# Patient Record
Sex: MALE | Race: OTHER | HISPANIC OR LATINO
[De-identification: names, ages, dates, MRNs, and addresses within clinical notes are randomized per-mention and may not be internally consistent; named-entity substitution may affect disease eponyms.]

---

## 2019-10-23 ENCOUNTER — APPOINTMENT (OUTPATIENT)
Dept: GASTROENTEROLOGY | Facility: CLINIC | Age: 54
End: 2019-10-23
Payer: MEDICAID

## 2019-10-23 VITALS
DIASTOLIC BLOOD PRESSURE: 89 MMHG | BODY MASS INDEX: 26.05 KG/M2 | SYSTOLIC BLOOD PRESSURE: 127 MMHG | WEIGHT: 182 LBS | HEIGHT: 70 IN | HEART RATE: 77 BPM

## 2019-10-23 DIAGNOSIS — Z78.9 OTHER SPECIFIED HEALTH STATUS: ICD-10-CM

## 2019-10-23 PROCEDURE — 99213 OFFICE O/P EST LOW 20 MIN: CPT

## 2019-10-24 NOTE — ASSESSMENT
[FreeTextEntry1] : The patient is a 54-year-old gentleman with no past medical history presents for evaluation after found to have a colon polyp. Patient had a scope by  and was found to have a large polyp. \par \par Endoscopic Mucosal Resection\par - Procedure discussed with patient in detail  with risks addressed such as bleeding and perforation that may require surgery\par - Prep prescribed and adherence stressed so visualization is optimal \par - Clear diet 24 hours prior to procedure \par - Follow up 3-4 weeks after procedure stressed to review pathology, arrange future treatments, and to insure follow ups for future surveillance are secure \par - Advised to hold NSAIDS including Aspirin one week prior\par

## 2019-10-24 NOTE — HISTORY OF PRESENT ILLNESS
[de-identified] : The patient is a 54-year-old gentleman with no past medical history presents for evaluation after found to have a colon polyp. Patient had a scope by  and was found to have a large polyp.

## 2019-11-22 ENCOUNTER — OUTPATIENT (OUTPATIENT)
Dept: OUTPATIENT SERVICES | Facility: HOSPITAL | Age: 54
LOS: 1 days | Discharge: HOME | End: 2019-11-22
Payer: MEDICAID

## 2019-11-22 ENCOUNTER — RESULT REVIEW (OUTPATIENT)
Age: 54
End: 2019-11-22

## 2019-11-22 ENCOUNTER — TRANSCRIPTION ENCOUNTER (OUTPATIENT)
Age: 54
End: 2019-11-22

## 2019-11-22 VITALS
HEIGHT: 70 IN | TEMPERATURE: 98 F | HEART RATE: 74 BPM | DIASTOLIC BLOOD PRESSURE: 69 MMHG | RESPIRATION RATE: 18 BRPM | SYSTOLIC BLOOD PRESSURE: 127 MMHG | WEIGHT: 179.9 LBS

## 2019-11-22 VITALS
SYSTOLIC BLOOD PRESSURE: 128 MMHG | OXYGEN SATURATION: 99 % | HEART RATE: 74 BPM | RESPIRATION RATE: 16 BRPM | DIASTOLIC BLOOD PRESSURE: 71 MMHG

## 2019-11-22 DIAGNOSIS — Z98.890 OTHER SPECIFIED POSTPROCEDURAL STATES: Chronic | ICD-10-CM

## 2019-11-22 PROCEDURE — 45380 COLONOSCOPY AND BIOPSY: CPT | Mod: XU

## 2019-11-22 PROCEDURE — 45390 COLONOSCOPY W/RESECTION: CPT

## 2019-11-22 PROCEDURE — 45381 COLONOSCOPY SUBMUCOUS NJX: CPT | Mod: XU

## 2019-11-22 PROCEDURE — 88305 TISSUE EXAM BY PATHOLOGIST: CPT | Mod: 26

## 2019-11-22 NOTE — CHART NOTE - NSCHARTNOTEFT_GEN_A_CORE
PACU ANESTHESIA PACU ADMISSION NOTE      Procedure:  Post op diagnosis    ____ Intubated  TV:______       Rate: ______      FiO2: ______    ___x_ Patent Airway    ___x_ Full return of protective reflexes    ____ Full recovery from anesthesia / sedation to baseline status    Viitals:  see anesthesia record    Mental Status:  __x__ Awake   _____ Alert   _____ Drowsy   _____ Sedated    Nausea/Vomiting: ____ Yes, See Post - Op Orders      _x___ No    Pain Scale (0-10): _____    Treatment: ____ None    __x__ See Post - Op/PCA Orders    Post - Operative Fluids:   ____ Oral   __x__ See Post - Op Orders    Plan:         Discharge:   _x___Home       _____Floor         _____Critical Care    _____Other:_________________    Comments: uneventful perioperative course; no s/s anesthesia complications noted; D/C home when criteria met

## 2019-12-05 LAB — SURGICAL PATHOLOGY STUDY: SIGNIFICANT CHANGE UP

## 2019-12-06 DIAGNOSIS — D12.3 BENIGN NEOPLASM OF TRANSVERSE COLON: ICD-10-CM

## 2019-12-06 DIAGNOSIS — C18.2 MALIGNANT NEOPLASM OF ASCENDING COLON: ICD-10-CM

## 2019-12-06 DIAGNOSIS — Z91.018 ALLERGY TO OTHER FOODS: ICD-10-CM

## 2020-01-10 ENCOUNTER — APPOINTMENT (OUTPATIENT)
Dept: GASTROENTEROLOGY | Facility: CLINIC | Age: 55
End: 2020-01-10
Payer: MEDICAID

## 2020-01-10 VITALS
SYSTOLIC BLOOD PRESSURE: 146 MMHG | HEART RATE: 95 BPM | HEIGHT: 70 IN | WEIGHT: 180 LBS | BODY MASS INDEX: 25.77 KG/M2 | DIASTOLIC BLOOD PRESSURE: 94 MMHG

## 2020-01-10 DIAGNOSIS — Z86.010 PERSONAL HISTORY OF COLONIC POLYPS: ICD-10-CM

## 2020-01-10 DIAGNOSIS — D36.9 BENIGN NEOPLASM, UNSPECIFIED SITE: ICD-10-CM

## 2020-01-10 PROCEDURE — 99214 OFFICE O/P EST MOD 30 MIN: CPT

## 2020-01-10 NOTE — PHYSICAL EXAM
[General Appearance - Alert] : alert [General Appearance - In No Acute Distress] : in no acute distress [Sclera] : the sclera and conjunctiva were normal [PERRL With Normal Accommodation] : pupils were equal in size, round, and reactive to light [Outer Ear] : the ears and nose were normal in appearance [Extraocular Movements] : extraocular movements were intact [Oropharynx] : the oropharynx was normal [Neck Appearance] : the appearance of the neck was normal [Neck Cervical Mass (___cm)] : no neck mass was observed [Thyroid Diffuse Enlargement] : the thyroid was not enlarged [Thyroid Nodule] : there were no palpable thyroid nodules [Jugular Venous Distention Increased] : there was no jugular-venous distention [Auscultation Breath Sounds / Voice Sounds] : lungs were clear to auscultation bilaterally [Heart Sounds] : normal S1 and S2 [Heart Sounds Gallop] : no gallops [Heart Rate And Rhythm] : heart rate was normal and rhythm regular [Murmurs] : no murmurs [Full Pulse] : the pedal pulses are present [Heart Sounds Pericardial Friction Rub] : no pericardial rub [Edema] : there was no peripheral edema [Bowel Sounds] : normal bowel sounds [Breast Palpation Mass] : no palpable masses [Abdomen Tenderness] : non-tender [Abdomen Soft] : soft [] : no hepato-splenomegaly [Abdomen Mass (___ Cm)] : no abdominal mass palpated [Abnormal Walk] : normal gait [Nail Clubbing] : no clubbing  or cyanosis of the fingernails [Motor Tone] : muscle strength and tone were normal [No Focal Deficits] : no focal deficits [Mood] : the mood was normal [Affect] : the affect was normal

## 2020-01-11 NOTE — ASSESSMENT
[FreeTextEntry1] : The patient is a 54-year-old gentleman with no past medical history presents for evaluation after found to have a colon polyp. Patient had a scope by  and was found to have a large polyp. Patient had Colonoscopy with EMR of a Ascending Colon polyp. 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 Discussed Colonoscopy in detail with patient. Discussed the need for repeat Colonoscopy with \par \par Tubullovillous Adenoma with foci of High grade Dysplasia\par - Referral to Dr. [facility] rectal surgery\par - Discussed that he might need Colon Resection\par - Appointment setup with [facility]

## 2020-01-11 NOTE — HISTORY OF PRESENT ILLNESS
[de-identified] : The patient is a 54-year-old gentleman with no past medical history presents for evaluation after found to have a colon polyp. Patient had a scope by  and was found to have a large polyp. Patient had Colonoscopy with EMR of a Ascending Colon polyp. 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

## 2020-01-13 ENCOUNTER — LABORATORY RESULT (OUTPATIENT)
Age: 55
End: 2020-01-13

## 2020-01-13 ENCOUNTER — APPOINTMENT (OUTPATIENT)
Dept: SURGERY | Facility: CLINIC | Age: 55
End: 2020-01-13
Payer: MEDICAID

## 2020-01-13 VITALS
TEMPERATURE: 98.2 F | DIASTOLIC BLOOD PRESSURE: 80 MMHG | WEIGHT: 184 LBS | SYSTOLIC BLOOD PRESSURE: 115 MMHG | BODY MASS INDEX: 26.34 KG/M2 | HEART RATE: 78 BPM | HEIGHT: 70 IN

## 2020-01-13 DIAGNOSIS — D12.6 BENIGN NEOPLASM OF COLON, UNSPECIFIED: ICD-10-CM

## 2020-01-13 DIAGNOSIS — D12.2 BENIGN NEOPLASM OF ASCENDING COLON: ICD-10-CM

## 2020-01-13 PROCEDURE — 99204 OFFICE O/P NEW MOD 45 MIN: CPT

## 2020-01-13 RX ORDER — SODIUM SULFATE, POTASSIUM SULFATE, MAGNESIUM SULFATE 17.5; 3.13; 1.6 G/ML; G/ML; G/ML
17.5-3.13-1.6 SOLUTION, CONCENTRATE ORAL
Qty: 1 | Refills: 0 | Status: DISCONTINUED | COMMUNITY
Start: 2019-10-23 | End: 2020-01-13

## 2020-01-13 RX ORDER — MELOXICAM 15 MG/1
TABLET ORAL
Refills: 0 | Status: DISCONTINUED | COMMUNITY
End: 2019-11-01

## 2020-01-13 NOTE — CONSULT LETTER
[Dear  ___] : Dear  [unfilled], [Consult Letter:] : I had the pleasure of evaluating your patient, [unfilled]. [Consult Closing:] : Thank you very much for allowing me to participate in the care of this patient.  If you have any questions, please do not hesitate to contact me. [Please see my note below.] : Please see my note below. [FreeTextEntry3] : Sincerely,\par \par Shine Hirshc MD, Colon and Rectal Surgery\par \par [employer] [facility], [place]\par [address] [place] [address]\par [facility], 3rd Floor\par [place], [place] [address]\par Tel [phone] ext [phone]\par Fax [phone]\par  [DrEric  ___] : Dr. REYES

## 2020-01-13 NOTE — PHYSICAL EXAM
[Abdomen Masses] : No abdominal masses [No HSM] : no hepatosplenomegaly [Abdomen Tenderness] : ~T No ~M abdominal tenderness [Respiratory Effort] : normal respiratory effort [Normal Rate and Rhythm] : normal rate and rhythm [de-identified] : awake, alert and in no acute distress

## 2020-01-13 NOTE — ASSESSMENT
[FreeTextEntry1] : 54M with endoscopically unresectable ascending colon polyp\par \par I had a long discussion with the patient about his pathology.  At this point the patient has a diagnosis of high grade dysplasia/ intramucosal adenocarcinoma.  There remains a persistent portion of the polyp with tubulovillous adenoma on biopsy. I discussed the case with Dr. Granados and given the possible diagnosis of invasive adenocarcinoma we decided surgical resection would be appropriate to remove the remainder of the polyp and have a definitive pathologic evaluation. We will begin with a malignant workup including CT chest abdomen and pelvis with CEA level.  Provided those results are favorable we will proceed with laparoscopic possible open right hemicolectomy.  All risks benefits and alternatives were discussed including bleeding, infection, damage to surrounding structures, anastomotic leak, anastomotic stricture, cardiopulmonary events, thromboembolic events, neuropathy and hernia. We expect a 3-7 day hospital stay and 6-8 week recovery. Patient was in agreement with the plan and signed surgical consent.\par

## 2020-01-13 NOTE — REASON FOR VISIT
[Initial Evaluation] : an initial evaluation [FreeTextEntry1] : Endoscopically Unresectable Ascending Colon Polyp

## 2020-01-13 NOTE — HISTORY OF PRESENT ILLNESS
[FreeTextEntry1] : Patient is a 54M who presents for evaluation of an endoscopically unresectable ascending colon polyp.  Patient originally underwent screening colonoscopy with Dr. Holbrook on 8/23/19.  This showed a 4-5cm ascending colon polyp.  The patient was referred to Dr. Granados to performed EMR on 11/22/19.  The lesion was removed piecemeal and a small central area was left behind due to poor lift.  Pathology showed tubulovillous adenoma with foci of high grade dysplasia/intramucosal adenocarcinoma.  Biopsies of the central area showed partially disrupted fragments of tubulovillous adenoma.  He was seen in follow up with Dr. Granados and recommended a surgical resection.  Patient states that he feels well.  He is tolerating a diet and denies fevers, chills, nausea, vomiting, abdominal pain, constipation, diarrhea, blood in his stool or unexpected weight loss.  Patient denies a family history of colon cancer rectal cancer or inflammatory bowel disease.

## 2020-01-14 ENCOUNTER — OTHER (OUTPATIENT)
Age: 55
End: 2020-01-14

## 2020-01-17 ENCOUNTER — OUTPATIENT (OUTPATIENT)
Dept: OUTPATIENT SERVICES | Facility: HOSPITAL | Age: 55
LOS: 1 days | Discharge: HOME | End: 2020-01-17
Payer: MEDICAID

## 2020-01-17 DIAGNOSIS — D12.8 BENIGN NEOPLASM OF RECTUM: ICD-10-CM

## 2020-01-17 DIAGNOSIS — R10.2 PELVIC AND PERINEAL PAIN: ICD-10-CM

## 2020-01-17 DIAGNOSIS — Z98.890 OTHER SPECIFIED POSTPROCEDURAL STATES: Chronic | ICD-10-CM

## 2020-01-17 DIAGNOSIS — R10.9 UNSPECIFIED ABDOMINAL PAIN: ICD-10-CM

## 2020-01-17 PROCEDURE — 71260 CT THORAX DX C+: CPT | Mod: 26

## 2020-01-17 PROCEDURE — 74177 CT ABD & PELVIS W/CONTRAST: CPT | Mod: 26

## 2020-01-21 ENCOUNTER — OTHER (OUTPATIENT)
Age: 55
End: 2020-01-21

## 2020-01-26 ENCOUNTER — OUTPATIENT (OUTPATIENT)
Dept: OUTPATIENT SERVICES | Facility: HOSPITAL | Age: 55
LOS: 1 days | Discharge: HOME | End: 2020-01-26
Payer: MEDICAID

## 2020-01-26 DIAGNOSIS — D12.6 BENIGN NEOPLASM OF COLON, UNSPECIFIED: ICD-10-CM

## 2020-01-26 DIAGNOSIS — Z98.890 OTHER SPECIFIED POSTPROCEDURAL STATES: Chronic | ICD-10-CM

## 2020-01-26 PROCEDURE — 74183 MRI ABD W/O CNTR FLWD CNTR: CPT | Mod: 26

## 2020-01-27 NOTE — ASU DISCHARGE PLAN (ADULT/PEDIATRIC) - CALL YOUR DOCTOR IF YOU HAVE ANY OF THE FOLLOWING:
[FreeTextEntry1] : In summary patient is a 71-year-old former smoker with past medical history significant for asthma, hypertension who presents for follow up.  The patient's physical exam was within normal limits. \par The patient received Xolair 300 mg intramuscular.\par \par 
Bleeding that does not stop/Pain not relieved by Medications/Nausea and vomiting that does not stop/Inability to tolerate liquids or foods

## 2020-02-06 ENCOUNTER — OUTPATIENT (OUTPATIENT)
Dept: OUTPATIENT SERVICES | Facility: HOSPITAL | Age: 55
LOS: 1 days | Discharge: HOME | End: 2020-02-06
Payer: MEDICAID

## 2020-02-06 VITALS
DIASTOLIC BLOOD PRESSURE: 82 MMHG | RESPIRATION RATE: 18 BRPM | HEART RATE: 74 BPM | HEIGHT: 70 IN | WEIGHT: 184.97 LBS | SYSTOLIC BLOOD PRESSURE: 150 MMHG | TEMPERATURE: 97 F | OXYGEN SATURATION: 99 %

## 2020-02-06 DIAGNOSIS — D12.6 BENIGN NEOPLASM OF COLON, UNSPECIFIED: ICD-10-CM

## 2020-02-06 DIAGNOSIS — Z98.890 OTHER SPECIFIED POSTPROCEDURAL STATES: Chronic | ICD-10-CM

## 2020-02-06 DIAGNOSIS — Z01.818 ENCOUNTER FOR OTHER PREPROCEDURAL EXAMINATION: ICD-10-CM

## 2020-02-06 LAB
ALBUMIN SERPL ELPH-MCNC: 4.4 G/DL — SIGNIFICANT CHANGE UP (ref 3.5–5.2)
ALP SERPL-CCNC: 62 U/L — SIGNIFICANT CHANGE UP (ref 30–115)
ALT FLD-CCNC: 22 U/L — SIGNIFICANT CHANGE UP (ref 0–41)
ANION GAP SERPL CALC-SCNC: 11 MMOL/L — SIGNIFICANT CHANGE UP (ref 7–14)
APPEARANCE UR: CLEAR — SIGNIFICANT CHANGE UP
APTT BLD: 30.7 SEC — SIGNIFICANT CHANGE UP (ref 27–39.2)
AST SERPL-CCNC: 18 U/L — SIGNIFICANT CHANGE UP (ref 0–41)
BASOPHILS # BLD AUTO: 0.07 K/UL — SIGNIFICANT CHANGE UP (ref 0–0.2)
BASOPHILS NFR BLD AUTO: 1.2 % — HIGH (ref 0–1)
BILIRUB SERPL-MCNC: <0.2 MG/DL — SIGNIFICANT CHANGE UP (ref 0.2–1.2)
BILIRUB UR-MCNC: NEGATIVE — SIGNIFICANT CHANGE UP
BLD GP AB SCN SERPL QL: SIGNIFICANT CHANGE UP
BUN SERPL-MCNC: 16 MG/DL — SIGNIFICANT CHANGE UP (ref 10–20)
CALCIUM SERPL-MCNC: 9.2 MG/DL — SIGNIFICANT CHANGE UP (ref 8.5–10.1)
CHLORIDE SERPL-SCNC: 105 MMOL/L — SIGNIFICANT CHANGE UP (ref 98–110)
CO2 SERPL-SCNC: 25 MMOL/L — SIGNIFICANT CHANGE UP (ref 17–32)
COLOR SPEC: SIGNIFICANT CHANGE UP
CREAT SERPL-MCNC: 0.7 MG/DL — SIGNIFICANT CHANGE UP (ref 0.7–1.5)
DIFF PNL FLD: NEGATIVE — SIGNIFICANT CHANGE UP
EOSINOPHIL # BLD AUTO: 0.26 K/UL — SIGNIFICANT CHANGE UP (ref 0–0.7)
EOSINOPHIL NFR BLD AUTO: 4.6 % — SIGNIFICANT CHANGE UP (ref 0–8)
ESTIMATED AVERAGE GLUCOSE: 114 MG/DL — SIGNIFICANT CHANGE UP (ref 68–114)
GLUCOSE SERPL-MCNC: 88 MG/DL — SIGNIFICANT CHANGE UP (ref 70–99)
GLUCOSE UR QL: NEGATIVE — SIGNIFICANT CHANGE UP
HBA1C BLD-MCNC: 5.6 % — SIGNIFICANT CHANGE UP (ref 4–5.6)
HCT VFR BLD CALC: 43.6 % — SIGNIFICANT CHANGE UP (ref 42–52)
HGB BLD-MCNC: 15.5 G/DL — SIGNIFICANT CHANGE UP (ref 14–18)
IMM GRANULOCYTES NFR BLD AUTO: 0.2 % — SIGNIFICANT CHANGE UP (ref 0.1–0.3)
INR BLD: 0.9 RATIO — SIGNIFICANT CHANGE UP (ref 0.65–1.3)
KETONES UR-MCNC: NEGATIVE — SIGNIFICANT CHANGE UP
LEUKOCYTE ESTERASE UR-ACNC: NEGATIVE — SIGNIFICANT CHANGE UP
LYMPHOCYTES # BLD AUTO: 1.68 K/UL — SIGNIFICANT CHANGE UP (ref 1.2–3.4)
LYMPHOCYTES # BLD AUTO: 29.8 % — SIGNIFICANT CHANGE UP (ref 20.5–51.1)
MCHC RBC-ENTMCNC: 32.8 PG — HIGH (ref 27–31)
MCHC RBC-ENTMCNC: 35.6 G/DL — SIGNIFICANT CHANGE UP (ref 32–37)
MCV RBC AUTO: 92.4 FL — SIGNIFICANT CHANGE UP (ref 80–94)
MONOCYTES # BLD AUTO: 0.53 K/UL — SIGNIFICANT CHANGE UP (ref 0.1–0.6)
MONOCYTES NFR BLD AUTO: 9.4 % — HIGH (ref 1.7–9.3)
MRSA PCR RESULT.: NEGATIVE — SIGNIFICANT CHANGE UP
NEUTROPHILS # BLD AUTO: 3.08 K/UL — SIGNIFICANT CHANGE UP (ref 1.4–6.5)
NEUTROPHILS NFR BLD AUTO: 54.8 % — SIGNIFICANT CHANGE UP (ref 42.2–75.2)
NITRITE UR-MCNC: NEGATIVE — SIGNIFICANT CHANGE UP
NRBC # BLD: 0 /100 WBCS — SIGNIFICANT CHANGE UP (ref 0–0)
PH UR: 6.5 — SIGNIFICANT CHANGE UP (ref 5–8)
PLATELET # BLD AUTO: 254 K/UL — SIGNIFICANT CHANGE UP (ref 130–400)
POTASSIUM SERPL-MCNC: 4.3 MMOL/L — SIGNIFICANT CHANGE UP (ref 3.5–5)
POTASSIUM SERPL-SCNC: 4.3 MMOL/L — SIGNIFICANT CHANGE UP (ref 3.5–5)
PROT SERPL-MCNC: 6.6 G/DL — SIGNIFICANT CHANGE UP (ref 6–8)
PROT UR-MCNC: NEGATIVE — SIGNIFICANT CHANGE UP
PROTHROM AB SERPL-ACNC: 10.3 SEC — SIGNIFICANT CHANGE UP (ref 9.95–12.87)
RBC # BLD: 4.72 M/UL — SIGNIFICANT CHANGE UP (ref 4.7–6.1)
RBC # FLD: 12.6 % — SIGNIFICANT CHANGE UP (ref 11.5–14.5)
SODIUM SERPL-SCNC: 141 MMOL/L — SIGNIFICANT CHANGE UP (ref 135–146)
SP GR SPEC: 1.02 — SIGNIFICANT CHANGE UP (ref 1.01–1.02)
UROBILINOGEN FLD QL: SIGNIFICANT CHANGE UP
WBC # BLD: 5.63 K/UL — SIGNIFICANT CHANGE UP (ref 4.8–10.8)
WBC # FLD AUTO: 5.63 K/UL — SIGNIFICANT CHANGE UP (ref 4.8–10.8)

## 2020-02-06 PROCEDURE — 93010 ELECTROCARDIOGRAM REPORT: CPT

## 2020-02-06 NOTE — H&P PST ADULT - NSANTHOSAYNRD_GEN_A_CORE
No. YOKASTA screening performed.  STOP BANG Legend: 0-2 = LOW Risk; 3-4 = INTERMEDIATE Risk; 5-8 = HIGH Risk

## 2020-02-06 NOTE — H&P PST ADULT - REASON FOR ADMISSION
56 yo m here for past. pt sched for lap rt hemicolectomy, poss open, poss ostomy on 2/21/2020  denies any cp, palpitations,sob, fever, dysuria, uti.uri, no rec travels  exc fernando 2 fos wo sob  + glasses  no hear def  no loose teeth/ no dent

## 2020-02-06 NOTE — H&P PST ADULT - ATTENDING COMMENTS
55M with incompletely removed ascending colon polyp for laparoscopic right hemicolectomy, poss open, poss ostomy on 2/21/2020

## 2020-02-13 PROBLEM — Z98.890 OTHER SPECIFIED POSTPROCEDURAL STATES: Chronic | Status: ACTIVE | Noted: 2020-02-06

## 2020-02-21 ENCOUNTER — RESULT REVIEW (OUTPATIENT)
Age: 55
End: 2020-02-21

## 2020-02-21 ENCOUNTER — INPATIENT (INPATIENT)
Facility: HOSPITAL | Age: 55
LOS: 2 days | Discharge: HOME | End: 2020-02-24
Attending: COLON & RECTAL SURGERY | Admitting: COLON & RECTAL SURGERY
Payer: MEDICAID

## 2020-02-21 ENCOUNTER — APPOINTMENT (OUTPATIENT)
Dept: SURGERY | Facility: HOSPITAL | Age: 55
End: 2020-02-21

## 2020-02-21 VITALS
TEMPERATURE: 98 F | WEIGHT: 179.9 LBS | DIASTOLIC BLOOD PRESSURE: 79 MMHG | HEART RATE: 72 BPM | SYSTOLIC BLOOD PRESSURE: 125 MMHG | HEIGHT: 70 IN | RESPIRATION RATE: 18 BRPM

## 2020-02-21 DIAGNOSIS — Z98.890 OTHER SPECIFIED POSTPROCEDURAL STATES: Chronic | ICD-10-CM

## 2020-02-21 LAB
ANION GAP SERPL CALC-SCNC: 15 MMOL/L — HIGH (ref 7–14)
ANION GAP SERPL CALC-SCNC: 15 MMOL/L — HIGH (ref 7–14)
BUN SERPL-MCNC: 11 MG/DL — SIGNIFICANT CHANGE UP (ref 10–20)
BUN SERPL-MCNC: 9 MG/DL — LOW (ref 10–20)
CALCIUM SERPL-MCNC: 8.6 MG/DL — SIGNIFICANT CHANGE UP (ref 8.5–10.1)
CALCIUM SERPL-MCNC: 8.7 MG/DL — SIGNIFICANT CHANGE UP (ref 8.5–10.1)
CHLORIDE SERPL-SCNC: 100 MMOL/L — SIGNIFICANT CHANGE UP (ref 98–110)
CHLORIDE SERPL-SCNC: 103 MMOL/L — SIGNIFICANT CHANGE UP (ref 98–110)
CO2 SERPL-SCNC: 21 MMOL/L — SIGNIFICANT CHANGE UP (ref 17–32)
CO2 SERPL-SCNC: 23 MMOL/L — SIGNIFICANT CHANGE UP (ref 17–32)
CREAT SERPL-MCNC: 0.9 MG/DL — SIGNIFICANT CHANGE UP (ref 0.7–1.5)
CREAT SERPL-MCNC: 1 MG/DL — SIGNIFICANT CHANGE UP (ref 0.7–1.5)
GLUCOSE BLDC GLUCOMTR-MCNC: 113 MG/DL — HIGH (ref 70–99)
GLUCOSE BLDC GLUCOMTR-MCNC: 88 MG/DL — SIGNIFICANT CHANGE UP (ref 70–99)
GLUCOSE SERPL-MCNC: 115 MG/DL — HIGH (ref 70–99)
GLUCOSE SERPL-MCNC: 121 MG/DL — HIGH (ref 70–99)
HCT VFR BLD CALC: 41.1 % — LOW (ref 42–52)
HCT VFR BLD CALC: 43.7 % — SIGNIFICANT CHANGE UP (ref 42–52)
HGB BLD-MCNC: 14.6 G/DL — SIGNIFICANT CHANGE UP (ref 14–18)
HGB BLD-MCNC: 15.3 G/DL — SIGNIFICANT CHANGE UP (ref 14–18)
MAGNESIUM SERPL-MCNC: 1.8 MG/DL — SIGNIFICANT CHANGE UP (ref 1.8–2.4)
MAGNESIUM SERPL-MCNC: 1.9 MG/DL — SIGNIFICANT CHANGE UP (ref 1.8–2.4)
MCHC RBC-ENTMCNC: 32.4 PG — HIGH (ref 27–31)
MCHC RBC-ENTMCNC: 32.7 PG — HIGH (ref 27–31)
MCHC RBC-ENTMCNC: 35 G/DL — SIGNIFICANT CHANGE UP (ref 32–37)
MCHC RBC-ENTMCNC: 35.5 G/DL — SIGNIFICANT CHANGE UP (ref 32–37)
MCV RBC AUTO: 92.2 FL — SIGNIFICANT CHANGE UP (ref 80–94)
MCV RBC AUTO: 92.6 FL — SIGNIFICANT CHANGE UP (ref 80–94)
NRBC # BLD: 0 /100 WBCS — SIGNIFICANT CHANGE UP (ref 0–0)
NRBC # BLD: 0 /100 WBCS — SIGNIFICANT CHANGE UP (ref 0–0)
PLATELET # BLD AUTO: 219 K/UL — SIGNIFICANT CHANGE UP (ref 130–400)
PLATELET # BLD AUTO: 232 K/UL — SIGNIFICANT CHANGE UP (ref 130–400)
POTASSIUM SERPL-MCNC: 4.3 MMOL/L — SIGNIFICANT CHANGE UP (ref 3.5–5)
POTASSIUM SERPL-MCNC: 4.5 MMOL/L — SIGNIFICANT CHANGE UP (ref 3.5–5)
POTASSIUM SERPL-SCNC: 4.3 MMOL/L — SIGNIFICANT CHANGE UP (ref 3.5–5)
POTASSIUM SERPL-SCNC: 4.5 MMOL/L — SIGNIFICANT CHANGE UP (ref 3.5–5)
RBC # BLD: 4.46 M/UL — LOW (ref 4.7–6.1)
RBC # BLD: 4.72 M/UL — SIGNIFICANT CHANGE UP (ref 4.7–6.1)
RBC # FLD: 12.1 % — SIGNIFICANT CHANGE UP (ref 11.5–14.5)
RBC # FLD: 12.2 % — SIGNIFICANT CHANGE UP (ref 11.5–14.5)
SODIUM SERPL-SCNC: 138 MMOL/L — SIGNIFICANT CHANGE UP (ref 135–146)
SODIUM SERPL-SCNC: 139 MMOL/L — SIGNIFICANT CHANGE UP (ref 135–146)
WBC # BLD: 12.38 K/UL — HIGH (ref 4.8–10.8)
WBC # BLD: 15.78 K/UL — HIGH (ref 4.8–10.8)
WBC # FLD AUTO: 12.38 K/UL — HIGH (ref 4.8–10.8)
WBC # FLD AUTO: 15.78 K/UL — HIGH (ref 4.8–10.8)

## 2020-02-21 PROCEDURE — 44204 LAPARO PARTIAL COLECTOMY: CPT

## 2020-02-21 PROCEDURE — 88307 TISSUE EXAM BY PATHOLOGIST: CPT | Mod: 26

## 2020-02-21 RX ORDER — ENOXAPARIN SODIUM 100 MG/ML
40 INJECTION SUBCUTANEOUS ONCE
Refills: 0 | Status: COMPLETED | OUTPATIENT
Start: 2020-02-21 | End: 2020-02-21

## 2020-02-21 RX ORDER — SODIUM CHLORIDE 9 MG/ML
1000 INJECTION, SOLUTION INTRAVENOUS
Refills: 0 | Status: DISCONTINUED | OUTPATIENT
Start: 2020-02-21 | End: 2020-02-21

## 2020-02-21 RX ORDER — MAGNESIUM SULFATE 500 MG/ML
1 VIAL (ML) INJECTION ONCE
Refills: 0 | Status: COMPLETED | OUTPATIENT
Start: 2020-02-21 | End: 2020-02-21

## 2020-02-21 RX ORDER — HYDROMORPHONE HYDROCHLORIDE 2 MG/ML
0.2 INJECTION INTRAMUSCULAR; INTRAVENOUS; SUBCUTANEOUS EVERY 4 HOURS
Refills: 0 | Status: DISCONTINUED | OUTPATIENT
Start: 2020-02-21 | End: 2020-02-22

## 2020-02-21 RX ORDER — CEFOTETAN DISODIUM 1 G
2 VIAL (EA) INJECTION EVERY 12 HOURS
Refills: 0 | Status: COMPLETED | OUTPATIENT
Start: 2020-02-21 | End: 2020-02-22

## 2020-02-21 RX ORDER — KETOROLAC TROMETHAMINE 30 MG/ML
30 SYRINGE (ML) INJECTION EVERY 8 HOURS
Refills: 0 | Status: DISCONTINUED | OUTPATIENT
Start: 2020-02-21 | End: 2020-02-23

## 2020-02-21 RX ORDER — SODIUM CHLORIDE 9 MG/ML
1000 INJECTION, SOLUTION INTRAVENOUS
Refills: 0 | Status: DISCONTINUED | OUTPATIENT
Start: 2020-02-21 | End: 2020-02-22

## 2020-02-21 RX ORDER — ACETAMINOPHEN 500 MG
1000 TABLET ORAL ONCE
Refills: 0 | Status: COMPLETED | OUTPATIENT
Start: 2020-02-21 | End: 2020-02-21

## 2020-02-21 RX ORDER — ACETAMINOPHEN 500 MG
650 TABLET ORAL EVERY 6 HOURS
Refills: 0 | Status: DISCONTINUED | OUTPATIENT
Start: 2020-02-21 | End: 2020-02-24

## 2020-02-21 RX ORDER — MORPHINE SULFATE 50 MG/1
4 CAPSULE, EXTENDED RELEASE ORAL
Refills: 0 | Status: DISCONTINUED | OUTPATIENT
Start: 2020-02-21 | End: 2020-02-22

## 2020-02-21 RX ADMIN — Medication 1000 MILLIGRAM(S): at 18:17

## 2020-02-21 RX ADMIN — SODIUM CHLORIDE 75 MILLILITER(S): 9 INJECTION, SOLUTION INTRAVENOUS at 22:28

## 2020-02-21 RX ADMIN — Medication 100 GRAM(S): at 18:06

## 2020-02-21 RX ADMIN — Medication 30 MILLIGRAM(S): at 22:21

## 2020-02-21 RX ADMIN — Medication 650 MILLIGRAM(S): at 22:26

## 2020-02-21 RX ADMIN — Medication 650 MILLIGRAM(S): at 22:45

## 2020-02-21 RX ADMIN — SODIUM CHLORIDE 75 MILLILITER(S): 9 INJECTION, SOLUTION INTRAVENOUS at 15:00

## 2020-02-21 RX ADMIN — Medication 100 GRAM(S): at 22:56

## 2020-02-21 RX ADMIN — Medication 30 MILLIGRAM(S): at 21:37

## 2020-02-21 RX ADMIN — Medication 400 MILLIGRAM(S): at 18:06

## 2020-02-21 RX ADMIN — ENOXAPARIN SODIUM 40 MILLIGRAM(S): 100 INJECTION SUBCUTANEOUS at 18:13

## 2020-02-21 NOTE — CHART NOTE - NSCHARTNOTEFT_GEN_A_CORE
Post Operative Note  Patient: JASON ORTA 55y (15-Valerio-1965) Male   MRN: [id number]  Location: [place] [address] A  Visit: 02-21-20 Inpatient  Date: 02-21-20 @ 21:18    Procedure: s/p laparoscopic right hemicolectomy    Subjective:   Nausea: no, Vomiting: no, Ambulating: no, Flatus: no  Pain Assessment: no    Objective:  Vitals: T(F): 97.5 (02-21-20 @ 21:00), Max: 98 (02-21-20 @ 09:05)  HR: 90 (02-21-20 @ 21:00)  BP: 138/83 (02-21-20 @ 21:00) (96/60 - 138/83)  RR: 26 (02-21-20 @ 21:00)  SpO2: 99% (02-21-20 @ 21:00)  Vent Settings:     In:   02-21-20 @ 07:01  -  02-21-20 @ 21:18  --------------------------------------------------------  IN: 840 mL      IV Fluids: lactated ringers. 1000 milliLiter(s) (75 mL/Hr) IV Continuous <Continuous>      Out:   02-21-20 @ 07:01  -  02-21-20 @ 21:18  --------------------------------------------------------  OUT: 470 mL      EBL:     Voided Urine:   02-21-20 @ 07:01  -  02-21-20 @ 21:18  --------------------------------------------------------  OUT: 470 mL      Tello Catheter: yes no   Drains:   CASSIE:    ,   Chest Tube:      NG Tube:       Physical Examination:  General Appearance: NAD  HEENT: EOMI, sclera non-icteric.  Heart: RRR  Lungs: CTABL  Abdomen:  Soft, nontender, nondistended. No rigidity, guarding, or rebound tenderness. abdominal incision sites clean and dry, no signs of infection/active bleeding/drainage  MSK/Extremities: Warm & well-perfused. Peripheral pulses intact.  Skin: Warm, dry. No jaundice.       Medications: [Standing]  acetaminophen   Tablet .. 650 milliGRAM(s) Oral every 6 hours  cefoTEtan  IVPB 2 Gram(s) IV Intermittent every 12 hours  HYDROmorphone  Injectable 0.2 milliGRAM(s) IV Push every 4 hours PRN  ketorolac   Injectable 30 milliGRAM(s) IV Push every 8 hours PRN  lactated ringers. 1000 milliLiter(s) IV Continuous <Continuous>  morphine  - Injectable 4 milliGRAM(s) IV Push every 10 minutes PRN    Medications: [PRN]  acetaminophen   Tablet .. 650 milliGRAM(s) Oral every 6 hours  cefoTEtan  IVPB 2 Gram(s) IV Intermittent every 12 hours  HYDROmorphone  Injectable 0.2 milliGRAM(s) IV Push every 4 hours PRN  ketorolac   Injectable 30 milliGRAM(s) IV Push every 8 hours PRN  lactated ringers. 1000 milliLiter(s) IV Continuous <Continuous>  morphine  - Injectable 4 milliGRAM(s) IV Push every 10 minutes PRN    Labs:                        14.6   12.38 )-----------( 219      ( 21 Feb 2020 21:15 )             41.1     02-21    139  |  103  |  11  ----------------------------<  121<H>  4.5   |  21  |  1.0    Ca    8.7      21 Feb 2020 15:15  Mg     1.9     02-21            Imaging:  No post-op imaging studies    Assessment:  55yMale patient S/P *** for ***    Plan:  Pain control  SHAHRZAD tello tomorrow 2/22  DVT ppx  GI ppx  Diet: CLD  Activity: ambulate as tolerated    Date/Time: 02-21-20 @ 21:18 Post Operative Note  Patient: JASON ORTA 55y (15-Valerio-1965) Male   MRN: [id number]  Location: [facility] [address] A  Visit: 02-21-20 Inpatient  Date: 02-21-20 @ 21:18    Procedure: s/p laparoscopic right hemicolectomy, side to side small bowel transverse colon anastomosis.    Subjective:   Nausea: no, Vomiting: no, Ambulating: no, Flatus: no  Pain Assessment: no    Objective:  Vitals: T(F): 97.5 (02-21-20 @ 21:00), Max: 98 (02-21-20 @ 09:05)  HR: 90 (02-21-20 @ 21:00)  BP: 138/83 (02-21-20 @ 21:00) (96/60 - 138/83)  RR: 26 (02-21-20 @ 21:00)  SpO2: 99% (02-21-20 @ 21:00)  Vent Settings:     In:   02-21-20 @ 07:01  -  02-21-20 @ 21:18  --------------------------------------------------------  IN: 840 mL      IV Fluids: lactated ringers. 1000 milliLiter(s) (75 mL/Hr) IV Continuous <Continuous>      Out:   02-21-20 @ 07:01  -  02-21-20 @ 21:18  --------------------------------------------------------  OUT: 470 mL      EBL:     Voided Urine:   02-21-20 @ 07:01  -  02-21-20 @ 21:18  --------------------------------------------------------  OUT: 470 mL      Tello Catheter: yes no   Drains:   CASSIE:    ,   Chest Tube:      NG Tube:       Physical Examination:  General Appearance: NAD  HEENT: EOMI, sclera non-icteric.  Heart: RRR  Lungs: CTABL  Abdomen:  Soft, nontender, nondistended. No rigidity, guarding, or rebound tenderness. abdominal incision sites clean and dry, no signs of infection/active bleeding/drainage  MSK/Extremities: Warm & well-perfused. Peripheral pulses intact.  Skin: Warm, dry. No jaundice.       Medications: [Standing]  acetaminophen   Tablet .. 650 milliGRAM(s) Oral every 6 hours  cefoTEtan  IVPB 2 Gram(s) IV Intermittent every 12 hours  HYDROmorphone  Injectable 0.2 milliGRAM(s) IV Push every 4 hours PRN  ketorolac   Injectable 30 milliGRAM(s) IV Push every 8 hours PRN  lactated ringers. 1000 milliLiter(s) IV Continuous <Continuous>  morphine  - Injectable 4 milliGRAM(s) IV Push every 10 minutes PRN    Medications: [PRN]  acetaminophen   Tablet .. 650 milliGRAM(s) Oral every 6 hours  cefoTEtan  IVPB 2 Gram(s) IV Intermittent every 12 hours  HYDROmorphone  Injectable 0.2 milliGRAM(s) IV Push every 4 hours PRN  ketorolac   Injectable 30 milliGRAM(s) IV Push every 8 hours PRN  lactated ringers. 1000 milliLiter(s) IV Continuous <Continuous>  morphine  - Injectable 4 milliGRAM(s) IV Push every 10 minutes PRN    Labs:                        14.6   12.38 )-----------( 219      ( 21 Feb 2020 21:15 )             41.1     02-21    139  |  103  |  11  ----------------------------<  121<H>  4.5   |  21  |  1.0    Ca    8.7      21 Feb 2020 15:15  Mg     1.9     02-21            Imaging:  No post-op imaging studies    Assessment:  56 yo M with PMH of endoscopically unresectable ascending colon polyp (tubulovillous adenoma) is now s/p laparoscopic right hemicolectomy, side to side small bowel transverse colon anastomosis. Patient is hemodynamically stable within normal limits. Pain is controlled.    Plan:  Pain control  SHAHRZAD tello tomorrow 2/22  DVT ppx  GI ppx  Diet: CLD  Activity: ambulate as tolerated    Date/Time: 02-21-20 @ 21:18

## 2020-02-21 NOTE — ASU PREOP CHECKLIST - 1.
1150- heparin 500u sq x1 stat given right upper arm as per MD orders , written on green doctors orders sheet .

## 2020-02-21 NOTE — BRIEF OPERATIVE NOTE - OPERATION/FINDINGS
Distal ascending colon tattoo. No significant bleeding during the case. Hemostasis achieved. R hemicolectomy performed. Side-to-side small bowel to transverse colon anastomosis.

## 2020-02-22 LAB
ANION GAP SERPL CALC-SCNC: 11 MMOL/L — SIGNIFICANT CHANGE UP (ref 7–14)
BASOPHILS # BLD AUTO: 0.04 K/UL — SIGNIFICANT CHANGE UP (ref 0–0.2)
BASOPHILS NFR BLD AUTO: 0.6 % — SIGNIFICANT CHANGE UP (ref 0–1)
BUN SERPL-MCNC: 7 MG/DL — LOW (ref 10–20)
CALCIUM SERPL-MCNC: 8.5 MG/DL — SIGNIFICANT CHANGE UP (ref 8.5–10.1)
CHLORIDE SERPL-SCNC: 102 MMOL/L — SIGNIFICANT CHANGE UP (ref 98–110)
CO2 SERPL-SCNC: 28 MMOL/L — SIGNIFICANT CHANGE UP (ref 17–32)
CREAT SERPL-MCNC: 0.9 MG/DL — SIGNIFICANT CHANGE UP (ref 0.7–1.5)
EOSINOPHIL # BLD AUTO: 0.15 K/UL — SIGNIFICANT CHANGE UP (ref 0–0.7)
EOSINOPHIL NFR BLD AUTO: 2.2 % — SIGNIFICANT CHANGE UP (ref 0–8)
GLUCOSE SERPL-MCNC: 92 MG/DL — SIGNIFICANT CHANGE UP (ref 70–99)
HCT VFR BLD CALC: 38.4 % — LOW (ref 42–52)
HGB BLD-MCNC: 13.4 G/DL — LOW (ref 14–18)
IMM GRANULOCYTES NFR BLD AUTO: 0.3 % — SIGNIFICANT CHANGE UP (ref 0.1–0.3)
LYMPHOCYTES # BLD AUTO: 1.99 K/UL — SIGNIFICANT CHANGE UP (ref 1.2–3.4)
LYMPHOCYTES # BLD AUTO: 29.6 % — SIGNIFICANT CHANGE UP (ref 20.5–51.1)
MAGNESIUM SERPL-MCNC: 2.2 MG/DL — SIGNIFICANT CHANGE UP (ref 1.8–2.4)
MCHC RBC-ENTMCNC: 32.1 PG — HIGH (ref 27–31)
MCHC RBC-ENTMCNC: 34.9 G/DL — SIGNIFICANT CHANGE UP (ref 32–37)
MCV RBC AUTO: 91.9 FL — SIGNIFICANT CHANGE UP (ref 80–94)
MONOCYTES # BLD AUTO: 0.63 K/UL — HIGH (ref 0.1–0.6)
MONOCYTES NFR BLD AUTO: 9.4 % — HIGH (ref 1.7–9.3)
NEUTROPHILS # BLD AUTO: 3.9 K/UL — SIGNIFICANT CHANGE UP (ref 1.4–6.5)
NEUTROPHILS NFR BLD AUTO: 57.9 % — SIGNIFICANT CHANGE UP (ref 42.2–75.2)
NRBC # BLD: 0 /100 WBCS — SIGNIFICANT CHANGE UP (ref 0–0)
PHOSPHATE SERPL-MCNC: 2 MG/DL — LOW (ref 2.1–4.9)
PLATELET # BLD AUTO: 210 K/UL — SIGNIFICANT CHANGE UP (ref 130–400)
POTASSIUM SERPL-MCNC: 4.3 MMOL/L — SIGNIFICANT CHANGE UP (ref 3.5–5)
POTASSIUM SERPL-SCNC: 4.3 MMOL/L — SIGNIFICANT CHANGE UP (ref 3.5–5)
RBC # BLD: 4.18 M/UL — LOW (ref 4.7–6.1)
RBC # FLD: 12.5 % — SIGNIFICANT CHANGE UP (ref 11.5–14.5)
SODIUM SERPL-SCNC: 141 MMOL/L — SIGNIFICANT CHANGE UP (ref 135–146)
WBC # BLD: 6.73 K/UL — SIGNIFICANT CHANGE UP (ref 4.8–10.8)
WBC # FLD AUTO: 6.73 K/UL — SIGNIFICANT CHANGE UP (ref 4.8–10.8)

## 2020-02-22 RX ORDER — INFLUENZA VIRUS VACCINE 15; 15; 15; 15 UG/.5ML; UG/.5ML; UG/.5ML; UG/.5ML
0.5 SUSPENSION INTRAMUSCULAR ONCE
Refills: 0 | Status: DISCONTINUED | OUTPATIENT
Start: 2020-02-22 | End: 2020-02-24

## 2020-02-22 RX ORDER — ENOXAPARIN SODIUM 100 MG/ML
40 INJECTION SUBCUTANEOUS DAILY
Refills: 0 | Status: DISCONTINUED | OUTPATIENT
Start: 2020-02-22 | End: 2020-02-24

## 2020-02-22 RX ORDER — OXYCODONE HYDROCHLORIDE 5 MG/1
5 TABLET ORAL EVERY 6 HOURS
Refills: 0 | Status: DISCONTINUED | OUTPATIENT
Start: 2020-02-22 | End: 2020-02-24

## 2020-02-22 RX ORDER — SODIUM CHLORIDE 9 MG/ML
1000 INJECTION, SOLUTION INTRAVENOUS
Refills: 0 | Status: DISCONTINUED | OUTPATIENT
Start: 2020-02-22 | End: 2020-02-23

## 2020-02-22 RX ORDER — HEPARIN SODIUM 5000 [USP'U]/ML
5000 INJECTION INTRAVENOUS; SUBCUTANEOUS EVERY 8 HOURS
Refills: 0 | Status: DISCONTINUED | OUTPATIENT
Start: 2020-02-22 | End: 2020-02-22

## 2020-02-22 RX ADMIN — Medication 650 MILLIGRAM(S): at 17:18

## 2020-02-22 RX ADMIN — Medication 650 MILLIGRAM(S): at 05:39

## 2020-02-22 RX ADMIN — OXYCODONE HYDROCHLORIDE 5 MILLIGRAM(S): 5 TABLET ORAL at 17:16

## 2020-02-22 RX ADMIN — OXYCODONE HYDROCHLORIDE 5 MILLIGRAM(S): 5 TABLET ORAL at 12:25

## 2020-02-22 RX ADMIN — Medication 650 MILLIGRAM(S): at 11:16

## 2020-02-22 RX ADMIN — Medication 30 MILLIGRAM(S): at 07:27

## 2020-02-22 RX ADMIN — Medication 30 MILLIGRAM(S): at 22:54

## 2020-02-22 RX ADMIN — ENOXAPARIN SODIUM 40 MILLIGRAM(S): 100 INJECTION SUBCUTANEOUS at 11:09

## 2020-02-22 RX ADMIN — OXYCODONE HYDROCHLORIDE 5 MILLIGRAM(S): 5 TABLET ORAL at 11:54

## 2020-02-22 RX ADMIN — SODIUM CHLORIDE 40 MILLILITER(S): 9 INJECTION, SOLUTION INTRAVENOUS at 10:33

## 2020-02-22 RX ADMIN — Medication 100 GRAM(S): at 05:39

## 2020-02-22 RX ADMIN — Medication 650 MILLIGRAM(S): at 11:08

## 2020-02-22 RX ADMIN — Medication 650 MILLIGRAM(S): at 17:16

## 2020-02-22 RX ADMIN — OXYCODONE HYDROCHLORIDE 5 MILLIGRAM(S): 5 TABLET ORAL at 17:46

## 2020-02-22 NOTE — PROGRESS NOTE ADULT - SUBJECTIVE AND OBJECTIVE BOX
GENERAL SURGERY PROGRESS NOTE     JASON ORTA  55y  Male  Hospital day :1d  POD:  Procedure: Laparoscopic right hemicolectomy    OVERNIGHT EVENTS: Uneventful    T(F): 98 (02-22-20 @ 00:00), Max: 98 (02-21-20 @ 09:05)  HR: 92 (02-22-20 @ 00:00) (65 - 92)  BP: 126/74 (02-22-20 @ 00:00) (96/60 - 141/75)  ABP: --  ABP(mean): --  RR: 18 (02-22-20 @ 00:00) (10 - 26)  SpO2: 99% (02-21-20 @ 23:30) (95% - 100%)    DIET/FLUIDS: lactated ringers. 1000 milliLiter(s) IV Continuous <Continuous>        URINE:    Indwelling Urethral Catheter:     Connect To:  Bedside Drainage    Indication:  Perioperative use for Selected Surgical Procedures (02-21-20 @ 14:53)    GI proph:    AC/ proph:   ABx: cefoTEtan  IVPB 2 Gram(s) IV Intermittent every 12 hours      PHYSICAL EXAM:  General Appearance: NAD  HEENT: EOMI, sclera non-icteric.  Heart: RRR  Lungs: CTABL  Abdomen:  Soft, nontender, nondistended. No rigidity, guarding, or rebound tenderness. abdominal incision sites clean and dry, no signs of infection/active bleeding/drainage  MSK/Extremities: Warm & well-perfused. Peripheral pulses intact.  Skin: Warm, dry. No jaundice      LABS  Labs:  CAPILLARY BLOOD GLUCOSE      POCT Blood Glucose.: 88 mg/dL (21 Feb 2020 14:45)  POCT Blood Glucose.: 113 mg/dL (21 Feb 2020 08:54)                          14.6   12.38 )-----------( 219      ( 21 Feb 2020 21:15 )             41.1         02-21    138  |  100  |  9<L>  ----------------------------<  115<H>  4.3   |  23  |  0.9      Calcium, Total Serum: 8.6 mg/dL (02-21-20 @ 21:15)      LFTs:         Coags:                    RADIOLOGY & ADDITIONAL TESTS:      No new studies

## 2020-02-22 NOTE — PROGRESS NOTE ADULT - ASSESSMENT
54 yo M with PMH of endoscopically unresectable ascending colon polyp (tubulovillous adenoma) is now s/p laparoscopic right hemicolectomy, side to side small bowel transverse colon anastamosis. Patient is hemodynamically stable within normal limits. Pain is controlled.    PLAN:  Pain control  DC tello tomorrow 2/22  DVT ppx  GI ppx  Diet: CLD  Activity: ambulate as tolerated  Hypomagnesemia repleted 56 yo M with PMH of endoscopically unresectable ascending colon polyp (tubulovillous adenoma) is now s/p laparoscopic right hemicolectomy, side to side small bowel transverse colon anastomosis. Patient is hemodynamically stable within normal limits. Pain is controlled.    PLAN:  Pain control  DC tello today2/22  DVT ppx  GI ppx  Diet: CLD  Activity: ambulate as tolerated  Hypomagnesemia repleted

## 2020-02-23 LAB
ANION GAP SERPL CALC-SCNC: 10 MMOL/L — SIGNIFICANT CHANGE UP (ref 7–14)
BUN SERPL-MCNC: 9 MG/DL — LOW (ref 10–20)
CALCIUM SERPL-MCNC: 8.7 MG/DL — SIGNIFICANT CHANGE UP (ref 8.5–10.1)
CHLORIDE SERPL-SCNC: 102 MMOL/L — SIGNIFICANT CHANGE UP (ref 98–110)
CO2 SERPL-SCNC: 28 MMOL/L — SIGNIFICANT CHANGE UP (ref 17–32)
CREAT SERPL-MCNC: 0.7 MG/DL — SIGNIFICANT CHANGE UP (ref 0.7–1.5)
GLUCOSE SERPL-MCNC: 96 MG/DL — SIGNIFICANT CHANGE UP (ref 70–99)
HCT VFR BLD CALC: 39.2 % — LOW (ref 42–52)
HGB BLD-MCNC: 13.5 G/DL — LOW (ref 14–18)
MAGNESIUM SERPL-MCNC: 2.1 MG/DL — SIGNIFICANT CHANGE UP (ref 1.8–2.4)
MCHC RBC-ENTMCNC: 31.8 PG — HIGH (ref 27–31)
MCHC RBC-ENTMCNC: 34.4 G/DL — SIGNIFICANT CHANGE UP (ref 32–37)
MCV RBC AUTO: 92.2 FL — SIGNIFICANT CHANGE UP (ref 80–94)
NRBC # BLD: 0 /100 WBCS — SIGNIFICANT CHANGE UP (ref 0–0)
PHOSPHATE SERPL-MCNC: 2.6 MG/DL — SIGNIFICANT CHANGE UP (ref 2.1–4.9)
PHOSPHATE SERPL-MCNC: 2.9 MG/DL — SIGNIFICANT CHANGE UP (ref 2.1–4.9)
PLATELET # BLD AUTO: 226 K/UL — SIGNIFICANT CHANGE UP (ref 130–400)
POTASSIUM SERPL-MCNC: 4.1 MMOL/L — SIGNIFICANT CHANGE UP (ref 3.5–5)
POTASSIUM SERPL-SCNC: 4.1 MMOL/L — SIGNIFICANT CHANGE UP (ref 3.5–5)
RBC # BLD: 4.25 M/UL — LOW (ref 4.7–6.1)
RBC # FLD: 12.7 % — SIGNIFICANT CHANGE UP (ref 11.5–14.5)
SODIUM SERPL-SCNC: 140 MMOL/L — SIGNIFICANT CHANGE UP (ref 135–146)
WBC # BLD: 6.79 K/UL — SIGNIFICANT CHANGE UP (ref 4.8–10.8)
WBC # FLD AUTO: 6.79 K/UL — SIGNIFICANT CHANGE UP (ref 4.8–10.8)

## 2020-02-23 RX ORDER — SODIUM,POTASSIUM PHOSPHATES 278-250MG
1 POWDER IN PACKET (EA) ORAL EVERY 6 HOURS
Refills: 0 | Status: COMPLETED | OUTPATIENT
Start: 2020-02-23 | End: 2020-02-23

## 2020-02-23 RX ADMIN — Medication 650 MILLIGRAM(S): at 11:37

## 2020-02-23 RX ADMIN — OXYCODONE HYDROCHLORIDE 5 MILLIGRAM(S): 5 TABLET ORAL at 17:17

## 2020-02-23 RX ADMIN — Medication 650 MILLIGRAM(S): at 23:04

## 2020-02-23 RX ADMIN — Medication 650 MILLIGRAM(S): at 00:42

## 2020-02-23 RX ADMIN — OXYCODONE HYDROCHLORIDE 5 MILLIGRAM(S): 5 TABLET ORAL at 23:04

## 2020-02-23 RX ADMIN — Medication 1 PACKET(S): at 05:09

## 2020-02-23 RX ADMIN — OXYCODONE HYDROCHLORIDE 5 MILLIGRAM(S): 5 TABLET ORAL at 12:33

## 2020-02-23 RX ADMIN — ENOXAPARIN SODIUM 40 MILLIGRAM(S): 100 INJECTION SUBCUTANEOUS at 11:20

## 2020-02-23 RX ADMIN — Medication 650 MILLIGRAM(S): at 17:17

## 2020-02-23 RX ADMIN — Medication 650 MILLIGRAM(S): at 11:19

## 2020-02-23 RX ADMIN — OXYCODONE HYDROCHLORIDE 5 MILLIGRAM(S): 5 TABLET ORAL at 11:32

## 2020-02-23 RX ADMIN — Medication 1 PACKET(S): at 02:38

## 2020-02-23 RX ADMIN — Medication 650 MILLIGRAM(S): at 05:09

## 2020-02-23 RX ADMIN — Medication 650 MILLIGRAM(S): at 17:18

## 2020-02-23 NOTE — PROGRESS NOTE ADULT - SUBJECTIVE AND OBJECTIVE BOX
GENERAL SURGERY PROGRESS NOTE     JASON ORTA  55y  Male  Hospital day :2d  POD:  Procedure: Laparoscopic right hemicolectomy    OVERNIGHT EVENTS: no acute events overnight     T(F): 98.8 (02-23-20 @ 00:00), Max: 98.8 (02-23-20 @ 00:00)  HR: 85 (02-23-20 @ 00:00) (62 - 88)  BP: 109/57 (02-23-20 @ 00:00) (109/57 - 123/77)  RR: 18 (02-23-20 @ 00:00) (18 - 18)    DIET/FLUIDS: dextrose 5% + sodium chloride 0.45%. 1000 milliLiter(s) IV Continuous <Continuous>  potassium phosphate / sodium phosphate powder 1 Packet(s) Oral every 6 hours      URINE:   02-21-20 @ 07:01  -  02-22-20 @ 07:00  --------------------------------------------------------  OUT: 1605 mL       GI proph:    AC/ proph:   ABx:     PHYSICAL EXAM:  GENERAL: NAD, well-appearing  CHEST/LUNG: Clear to auscultation bilaterally  HEART: Regular rate and rhythm  ABDOMEN: Soft, Nontender, Nondistended;   EXTREMITIES:  No clubbing, cyanosis, or edema      LABS  Labs:  CAPILLARY BLOOD GLUCOSE                              13.4   6.73  )-----------( 210      ( 22 Feb 2020 20:56 )             38.4       Auto Neutrophil %: 57.9 % (02-22-20 @ 20:56)  Auto Immature Granulocyte %: 0.3 % (02-22-20 @ 20:56)    02-22    141  |  102  |  7<L>  ----------------------------<  92  4.3   |  28  |  0.9      Calcium, Total Serum: 8.5 mg/dL (02-22-20 @ 20:56)

## 2020-02-24 ENCOUNTER — TRANSCRIPTION ENCOUNTER (OUTPATIENT)
Age: 55
End: 2020-02-24

## 2020-02-24 VITALS
SYSTOLIC BLOOD PRESSURE: 127 MMHG | HEART RATE: 68 BPM | DIASTOLIC BLOOD PRESSURE: 78 MMHG | RESPIRATION RATE: 18 BRPM | TEMPERATURE: 98 F

## 2020-02-24 RX ORDER — ACETAMINOPHEN 500 MG
2 TABLET ORAL
Qty: 0 | Refills: 0 | DISCHARGE
Start: 2020-02-24

## 2020-02-24 RX ORDER — NEOMYCIN SULFATE 500 MG/1
0 TABLET ORAL
Qty: 0 | Refills: 0 | DISCHARGE

## 2020-02-24 RX ORDER — OXYCODONE HYDROCHLORIDE 5 MG/1
1 TABLET ORAL
Qty: 5 | Refills: 0
Start: 2020-02-24

## 2020-02-24 RX ORDER — METRONIDAZOLE 500 MG
0 TABLET ORAL
Qty: 0 | Refills: 0 | DISCHARGE

## 2020-02-24 RX ADMIN — OXYCODONE HYDROCHLORIDE 5 MILLIGRAM(S): 5 TABLET ORAL at 13:15

## 2020-02-24 RX ADMIN — ENOXAPARIN SODIUM 40 MILLIGRAM(S): 100 INJECTION SUBCUTANEOUS at 11:28

## 2020-02-24 RX ADMIN — Medication 650 MILLIGRAM(S): at 05:34

## 2020-02-24 RX ADMIN — Medication 650 MILLIGRAM(S): at 11:28

## 2020-02-24 RX ADMIN — OXYCODONE HYDROCHLORIDE 5 MILLIGRAM(S): 5 TABLET ORAL at 13:45

## 2020-02-24 NOTE — DISCHARGE NOTE PROVIDER - HOSPITAL COURSE
56 yo M with PMH of tubulovillous adenoma polyp of the ascending colon s/p unresectable EMR presented for elective laparoscopic right hemicolectomy with side to side anastomosis of the transverse colon. Patient tolerated procedure well with no perioperative complications. No acute events during the hospital course. Patient was evaluated by PT stating no impairments. Patient is currently hemodynamically stable within normal limits, pain is controlled, tolerating PO diet, having normal bowel movements, ambulating normally, and is ready for discharge home.

## 2020-02-24 NOTE — DISCHARGE NOTE NURSING/CASE MANAGEMENT/SOCIAL WORK - PATIENT PORTAL LINK FT
You can access the FollowMyHealth Patient Portal offered by [facility] by registering at the following website: [email or web address]. By joining [employer]’s FollowMyHealth portal, you will also be able to view your health information using other applications (apps) compatible with our system.

## 2020-02-24 NOTE — PROGRESS NOTE ADULT - SUBJECTIVE AND OBJECTIVE BOX
GENERAL SURGERY PROGRESS NOTE     JASON ORTA  55y  Male  Hospital day: 4  POD: 3  Procedure: Laparoscopic right hemicolectomy    OVERNIGHT EVENTS: No acute events overnight, patient is passing flatus and having multiple bowel movements daily, he is tolerating low fiber diet without nausea or vomiting. He is voiding spontaneously and ambulating.     T(F): 97.4 (02-23-20 @ 23:50), Max: 97.4 (02-23-20 @ 23:50)  HR: 71 (02-23-20 @ 23:50) (67 - 91)  BP: 136/86 (02-23-20 @ 23:50) (119/75 - 136/86)  RR: 18 (02-23-20 @ 23:50) (18 - 18)    BM:   02-22-20 @ 07:01  -  02-23-20 @ 07:00  --------------------------------------------------------  OUT: 2 mL    PHYSICAL EXAM:  GENERAL: NAD, well-appearing  CHEST/LUNG: Clear to auscultation bilaterally  HEART: Regular rate and rhythm  ABDOMEN: Soft, Nontender, Nondistended; surgical incision site clean and dry without evidence of infection   EXTREMITIES:  No clubbing, cyanosis, or edema      LABS                        13.5   6.79  )-----------( 226      ( 23 Feb 2020 20:23 )             39.2         02-23    140  |  102  |  9<L>  ----------------------------<  96  4.1   |  28  |  0.7      Calcium, Total Serum: 8.7 mg/dL (02-23-20 @ 20:23)    RADIOLOGY & ADDITIONAL TESTS:  *No new imaging

## 2020-02-24 NOTE — DISCHARGE NOTE PROVIDER - NSDCCPCAREPLAN_GEN_ALL_CORE_FT
PRINCIPAL DISCHARGE DIAGNOSIS  Diagnosis: Tubulovillous adenoma of large intestine  Assessment and Plan of Treatment:

## 2020-02-24 NOTE — PROGRESS NOTE ADULT - ATTENDING COMMENTS
55M POD 2 s/p laparoscopic right hemicolectomy for endoscopically unresectable ascending colon polyp (tubulovillous adenoma).     Patient seen and examined. No acute events over night.  Tolerating liquids without nausea or vomiting.  Patient states he had flatus and had small liquid BM. Patient voiding without issue.    Abdomen: soft non distended, appropriately tender.  Wounds with dermabond in place.  No erythema or induration.    PLAN:  - PO tylenol, PO oxycodone for pain  - DVT ppx  - GI ppx  - advance to low fiber diet  - OOB ambulate  - DC home in 24- 48 hours
55M POD 3 s/p laparoscopic right hemicolectomy for endoscopically unresectable ascending colon polyp (tubulovillous adenoma).     Patient seen and examined. No acute events over night.  Tolerating low fiber diet without nausea or vomiting.  Patient states he continues to have flatus and BM. Patient voiding without issue.    Abdomen: soft non distended, appropriately tender.  Wounds with dermabond in place.  No erythema or induration.    PLAN:  - PO tylenol, PO oxycodone for pain  - DVT ppx  - GI ppx  - advance to low fiber diet  - OOB ambulate  - DC home with follow up in my office
55M POD 1 s/p laparoscopic right hemicolectomy for endoscopically unresectable ascending colon polyp (tubulovillous adenoma).     Patient seen and examined. No acute events over night.  Tolerating liquids without nausea or vomiting.  Denies flatus or BM    Abdomen: soft non distended, appropriately tender.  Wounds with dermabond in place.  No erythema or induration.    PLAN:  - PO tylenol, IV toradol, PO oxycodone for pain  - DC tello - TOV  - DVT ppx  - GI ppx  - CLD  - OOB ambulate  - await bowel function

## 2020-02-24 NOTE — PROGRESS NOTE ADULT - ASSESSMENT
56 yo M with PMH of endoscopically unresectable ascending colon polyp (tubulovillous adenoma) is now s/p laparoscopic right hemicolectomy, side to side small bowel transverse colon anastomosis. Patient is hemodynamically stable within normal limits. Pain is controlled. Passed TOV with 300cc urine output    PLAN:  -low fiber diet  -Adequate pain control   -DVT ppx   -GI ppx   -Encourage ambulation  -Encourage IS   -Discharge home with follow-up in Dr. Hirsch's office

## 2020-02-24 NOTE — DISCHARGE NOTE PROVIDER - NSDCMRMEDTOKEN_GEN_ALL_CORE_FT
acetaminophen 325 mg oral tablet: 2 tab(s) orally every 6 hours  oxyCODONE 5 mg oral tablet: 1 tab(s) orally every 6 hours, As needed, Severe Pain (7 - 10) MDD:4 tablets

## 2020-02-24 NOTE — DISCHARGE NOTE PROVIDER - NSDCCPTREATMENT_GEN_ALL_CORE_FT
PRINCIPAL PROCEDURE  Procedure: Laparoscopic right hemicolectomy  Findings and Treatment: You are being discharged from [facility]. Please call to schedule a follow up appointment with Dr. Hirsch as previously discussed/in 1 week. You have been prescribed pain medications, please take as directed. Please do not drive while under the influence of pain medications. You may shower, allowing the water to run over your wounds, but do not submerge in a water bath. Please avoid heavy weight lifting for the next 4-6 weeks.  If you have any further questions about your care, please do not hesitate to contact Dr. Hirsch's office or return to the Emergency Department. PRINCIPAL PROCEDURE  Procedure: Laparoscopic right hemicolectomy  Findings and Treatment: You are being discharged from [facility]. Please call to schedule a follow up appointment with Dr. Hirsch as previously discussed/in 1 week. You have been prescribed pain medications, please take as directed. Please do not drive while under the influence of pain medications. You may shower, allowing the water to run over your wounds, but do not submerge in a water bath. Please avoid heavy weight lifting for the next 4-6 weeks. We recommend eating a low fiber diet to help with recovery after surgery. This includes: white rice, white bread without nuts/seeds, well cooked vegetables without skins, fruits without pulp, milk, yogurt, tender chicken or fish, eggs. If you have any further questions about your care, please do not hesitate to contact Dr. Hirsch's office or return to the Emergency Department. PRINCIPAL PROCEDURE  Procedure: Laparoscopic right hemicolectomy  Findings and Treatment: [facility] Please call to schedule a follow up appointment with Dr. Hirsch as previously discussed/in 1 week. You have been prescribed pain medications, please take as directed. Please do not drive while under the influence of pain medications. You have been prescribed a blood thinning medication, please take as showed by your nurse. You may shower, allowing the water to run over your wounds, but do not submerge in a water bath. Please avoid heavy weight lifting for the next 4-6 weeks. We recommend eating a low fiber diet to help with recovery after surgery. This includes: white rice, white bread without nuts/seeds, well cooked vegetables without skins, fruits without pulp, milk, yogurt, tender chicken or fish, eggs. If you have any further questions about your care, please do not hesitate to contact Dr. Hirsch's office or return to the Emergency Department.

## 2020-02-24 NOTE — DISCHARGE NOTE PROVIDER - CARE PROVIDER_API CALL
Shine Hirsch)  ColonRectal Surgery; Surgery  [address] [facility], 3rd [place] [address]  Phone: [phone] ext [phone]  Fax: [phone]  Follow Up Time:

## 2020-02-24 NOTE — CONSULT NOTE ADULT - SUBJECTIVE AND OBJECTIVE BOX
T(C): 36.4 (02-24-20 @ 07:13), Max: 36.4 (02-24-20 @ 07:13)  HR: 68 (02-24-20 @ 07:13) (68 - 91)  BP: 127/78 (02-24-20 @ 07:13) (119/75 - 136/86)  RR: 18 (02-24-20 @ 07:13) (18 - 18)  SpO2: --    MOTOR EXAM      Physical Medicine And Rehabilitation Services are not indicated in this patient for the following Reason(s):    [    ] Patient is medically unstable      [    ]  Patient does not have appropriate activity orders      [     ] Patient has no weight bearing status for:      [  x   ] Patient is independently ambulating      [     ]  Patient is from Skilled Nursing Facility and is appropriate to return      [     ] Patient was non-ambulatory prior to admission      [     ]  Other      WILL CANCEL PM&R / PT request

## 2020-02-26 ENCOUNTER — OUTPATIENT (OUTPATIENT)
Dept: OUTPATIENT SERVICES | Facility: HOSPITAL | Age: 55
LOS: 1 days | Discharge: HOME | End: 2020-02-26
Payer: MEDICAID

## 2020-02-26 DIAGNOSIS — Z98.890 OTHER SPECIFIED POSTPROCEDURAL STATES: Chronic | ICD-10-CM

## 2020-02-26 DIAGNOSIS — I80.9 PHLEBITIS AND THROMBOPHLEBITIS OF UNSPECIFIED SITE: ICD-10-CM

## 2020-02-26 PROCEDURE — 93971 EXTREMITY STUDY: CPT | Mod: 26,LT

## 2020-02-27 DIAGNOSIS — K63.9 DISEASE OF INTESTINE, UNSPECIFIED: ICD-10-CM

## 2020-02-27 DIAGNOSIS — D12.6 BENIGN NEOPLASM OF COLON, UNSPECIFIED: ICD-10-CM

## 2020-02-27 LAB — SURGICAL PATHOLOGY STUDY: SIGNIFICANT CHANGE UP

## 2020-03-04 ENCOUNTER — APPOINTMENT (OUTPATIENT)
Dept: SURGERY | Facility: CLINIC | Age: 55
End: 2020-03-04
Payer: MEDICAID

## 2020-03-04 VITALS
BODY MASS INDEX: 25.2 KG/M2 | TEMPERATURE: 97.3 F | SYSTOLIC BLOOD PRESSURE: 122 MMHG | HEART RATE: 98 BPM | WEIGHT: 176 LBS | DIASTOLIC BLOOD PRESSURE: 82 MMHG | HEIGHT: 70 IN

## 2020-03-04 PROCEDURE — 99024 POSTOP FOLLOW-UP VISIT: CPT

## 2020-03-04 NOTE — CONSULT LETTER
[Dear  ___] : Dear  [unfilled], [Consult Letter:] : I had the pleasure of evaluating your patient, [unfilled]. [Please see my note below.] : Please see my note below. [Consult Closing:] : Thank you very much for allowing me to participate in the care of this patient.  If you have any questions, please do not hesitate to contact me. [DrEric  ___] : Dr. REYES [FreeTextEntry3] : Sincerely,\par \par Shine Hirsch MD, Colon and Rectal Surgery\par \par [employer] [facility]\par [address] [place] [address]\par [facility], 3rd Floor\par [place], [place] [address]\par Tel [phone] ext 2\par Fax [phone]\par  [DrEric ___] : Dr. REYES

## 2020-03-04 NOTE — ASSESSMENT
[FreeTextEntry1] : 55M with Stage 0 ascending colon cancer\par \par I had a long discussion with the patient about his pathology.  We discussed that a Tis lesion does not require additional treatment.  He will require endoscopic surveillance.  For this we will refer him back to Dr. Holbrook for colonoscopy in 1 year.  We will see him back in 1 month.

## 2020-03-04 NOTE — PHYSICAL EXAM
[Abdomen Masses] : No abdominal masses [Abdomen Tenderness] : ~T No ~M abdominal tenderness [No HSM] : no hepatosplenomegaly [Respiratory Effort] : normal respiratory effort [Normal Rate and Rhythm] : normal rate and rhythm [de-identified] : port site incisions and midline extraction site healing well with dermabond in place. No erythema induration or purulence present. [de-identified] : awake, alert and in no acute distress

## 2020-03-04 NOTE — HISTORY OF PRESENT ILLNESS
[FreeTextEntry1] : Patient is a 54M who presents for evaluation of an endoscopically unresectable ascending colon polyp.  Patient originally underwent screening colonoscopy with Dr. Holbrook on 8/23/19.  This showed a 4-5cm ascending colon polyp.  The patient was referred to Dr. Granados to performed EMR on 11/22/19.  The lesion was removed piecemeal and a small central area was left behind due to poor lift.  Pathology showed tubulovillous adenoma with foci of high grade dysplasia/intramucosal adenocarcinoma.  Biopsies of the central area showed partially disrupted fragments of tubulovillous adenoma.  On 2/21 he underwent laparoscopic right hemicolectomy.  Pathology showed intramucosal adenocarcinoma Tis with 0/19 LN.  He presents today for follow up.  Patient states he feels well.  He is tolerating a diet and denies fevers, chills, nausea, vomiting, abdominal pain, constipation, diarrhea, blood in his stool or unexpected weight loss.  He has 1-2 soft BM daily.  Patient denies a family history of colon cancer rectal cancer or inflammatory bowel disease.

## 2020-03-04 NOTE — REASON FOR VISIT
[Follow-Up: _____] : a [unfilled] follow-up visit S/P PRBC s/p venofer for iron deficiency. F/U paraproteinemia work up (P). Will give 6K X 1 dose today. Monitor Hb.

## 2020-04-06 ENCOUNTER — APPOINTMENT (OUTPATIENT)
Dept: SURGERY | Facility: CLINIC | Age: 55
End: 2020-04-06

## 2020-04-20 ENCOUNTER — APPOINTMENT (OUTPATIENT)
Dept: SURGERY | Facility: CLINIC | Age: 55
End: 2020-04-20

## 2020-06-01 ENCOUNTER — APPOINTMENT (OUTPATIENT)
Dept: SURGERY | Facility: CLINIC | Age: 55
End: 2020-06-01
Payer: MEDICAID

## 2020-06-01 VITALS
HEIGHT: 70 IN | DIASTOLIC BLOOD PRESSURE: 87 MMHG | SYSTOLIC BLOOD PRESSURE: 145 MMHG | BODY MASS INDEX: 27.49 KG/M2 | WEIGHT: 192 LBS

## 2020-06-01 PROCEDURE — 99213 OFFICE O/P EST LOW 20 MIN: CPT

## 2020-06-01 NOTE — ASSESSMENT
[FreeTextEntry1] : 55M with Stage 0 ascending colon cancer\par \par Patient is completely healed from surgery.  He will require endoscopic surveillance.  For this we will refer him back to Dr. Holbrook for colonoscopy in 2/2021.  We will see him back as needed

## 2020-06-01 NOTE — PHYSICAL EXAM
[Abdomen Masses] : No abdominal masses [Abdomen Tenderness] : ~T No ~M abdominal tenderness [No HSM] : no hepatosplenomegaly [Respiratory Effort] : normal respiratory effort [Normal Rate and Rhythm] : normal rate and rhythm [de-identified] : port site incisions and midline extraction site healing well. No erythema induration or purulence present. [de-identified] : awake, alert and in no acute distress

## 2020-10-28 NOTE — PROGRESS NOTE ADULT - ASSESSMENT
----- Message from Radha Sakody sent at 10/28/2020 11:13 AM EDT -----  Subject: Message to Provider    QUESTIONS  Information for Provider? Pt wants to know if you have any available   appointments at the [facility] with any provider next week on a   monday or a friday anytime. She has an appt set in [address] [facility]   but she's comfortable going to [place]. Call as soon as possible.  ---------------------------------------------------------------------------  --------------  CALL BACK INFO  What is the best way for the office to contact you? OK to leave message on   voicemail  Preferred Call Back Phone Number? [phone]  ---------------------------------------------------------------------------  --------------  SCRIPT ANSWERS  Relationship to Patient?  Self 56 yo M with PMH of endoscopically unresectable ascending colon polyp (tubulovillous adenoma) is now s/p laparoscopic right hemicolectomy, side to side small bowel transverse colon anastomosis. Patient is hemodynamically stable within normal limits. Pain is controlled. Passed TOV with 300cc urine output    PLAN:  Pain control  DVT ppx  GI ppx  Diet: CLD  Activity: ambulate as tolerated  Hypophosphatemia repleted

## 2021-01-25 ENCOUNTER — APPOINTMENT (OUTPATIENT)
Dept: SURGERY | Facility: CLINIC | Age: 56
End: 2021-01-25
Payer: MEDICAID

## 2021-01-25 VITALS
DIASTOLIC BLOOD PRESSURE: 90 MMHG | SYSTOLIC BLOOD PRESSURE: 140 MMHG | WEIGHT: 200 LBS | HEIGHT: 70 IN | TEMPERATURE: 97.6 F | BODY MASS INDEX: 28.63 KG/M2 | HEART RATE: 91 BPM

## 2021-01-25 PROCEDURE — 99072 ADDL SUPL MATRL&STAF TM PHE: CPT

## 2021-01-25 PROCEDURE — 99214 OFFICE O/P EST MOD 30 MIN: CPT

## 2021-01-27 ENCOUNTER — NON-APPOINTMENT (OUTPATIENT)
Age: 56
End: 2021-01-27

## 2021-01-27 NOTE — HISTORY OF PRESENT ILLNESS
[FreeTextEntry1] : Patient is a 54M who presents for follow up of intramucosal adenocarcinoma s/p laparoscopic right hemicolectomy.  Patient originally underwent screening colonoscopy with Dr. Holbrook on 8/23/19.  This showed a 4-5cm ascending colon polyp.  The patient was referred to Dr. Granados who performed EMR on 11/22/19.  The lesion was removed piecemeal and a small central area was left behind due to poor lift.  Pathology showed tubulovillous adenoma with foci of high grade dysplasia/intramucosal adenocarcinoma.  Biopsies of the central area showed partially disrupted fragments of tubulovillous adenoma.  On 2/21 he underwent laparoscopic right hemicolectomy.  Pathology showed intramucosal adenocarcinoma Tis with 0/19 LN.  He presents today for follow up.  He states he would like to schedule colonoscopy with our office.  Patient states he feels well.  He is tolerating a diet and denies fevers, chills, nausea, vomiting, abdominal pain, constipation, diarrhea, blood in his stool or unexpected weight loss.  He has 1-2 soft BM daily.  Patient denies a family history of colon cancer rectal cancer or inflammatory bowel disease.

## 2021-01-27 NOTE — CONSULT LETTER
[Dear  ___] : Dear  [unfilled], [Courtesy Letter:] : I had the pleasure of seeing your patient, [unfilled], in my office today. [Please see my note below.] : Please see my note below. [Consult Closing:] : Thank you very much for allowing me to participate in the care of this patient.  If you have any questions, please do not hesitate to contact me. [FreeTextEntry3] : Sincerely,\par \par Shine Hirsch MD, Colon and Rectal Surgery\par \par [employer] [facility]\par [address] [place] [address]\par [facility], 3rd Floor\par [place], [place] [address]\par Tel [phone] ext 2\par Fax [phone]\par  [DrEric  ___] : Dr. REYES

## 2021-01-27 NOTE — ASSESSMENT
[FreeTextEntry1] : 55M with Stage 0 ascending colon cancer\par \par The patient continues to do well.  We discussed the need for colonoscopic surveillance.  I recommended that he return to Dr. Holbrook or Dr. Granados for colonoscopy.  Patient states he would like to have his first post operative colonoscopy with our office.  We will perform this colonoscopy and refer him back to his GI. We will schedule the patient for colonoscopy at this time. All risks benefits and alternatives were discussed including incomplete colonoscopy, missed lesion, bleeding, infection and perforation requiring laparotomy.  The patient expressed understanding and was in agreement with the plan.\par

## 2021-01-27 NOTE — PHYSICAL EXAM
[No HSM] : no hepatosplenomegaly [Respiratory Effort] : normal respiratory effort [Normal Rate and Rhythm] : normal rate and rhythm [Abdomen Masses] : No abdominal masses [Abdomen Tenderness] : ~T No ~M abdominal tenderness [de-identified] : port site incisions and midline extraction sites well healed. No erythema induration or purulence present. [de-identified] : awake, alert and in no acute distress

## 2021-01-30 ENCOUNTER — LABORATORY RESULT (OUTPATIENT)
Age: 56
End: 2021-01-30

## 2021-01-30 ENCOUNTER — OUTPATIENT (OUTPATIENT)
Dept: OUTPATIENT SERVICES | Facility: HOSPITAL | Age: 56
LOS: 1 days | Discharge: HOME | End: 2021-01-30

## 2021-01-30 DIAGNOSIS — Z11.59 ENCOUNTER FOR SCREENING FOR OTHER VIRAL DISEASES: ICD-10-CM

## 2021-01-30 DIAGNOSIS — Z98.890 OTHER SPECIFIED POSTPROCEDURAL STATES: Chronic | ICD-10-CM

## 2021-02-01 ENCOUNTER — NON-APPOINTMENT (OUTPATIENT)
Age: 56
End: 2021-02-01

## 2021-02-02 ENCOUNTER — OUTPATIENT (OUTPATIENT)
Dept: OUTPATIENT SERVICES | Facility: HOSPITAL | Age: 56
LOS: 1 days | Discharge: HOME | End: 2021-02-02
Payer: MEDICAID

## 2021-02-02 ENCOUNTER — RESULT REVIEW (OUTPATIENT)
Age: 56
End: 2021-02-02

## 2021-02-02 ENCOUNTER — APPOINTMENT (OUTPATIENT)
Dept: SURGERY | Facility: HOSPITAL | Age: 56
End: 2021-02-02

## 2021-02-02 VITALS
HEIGHT: 70 IN | WEIGHT: 199.96 LBS | DIASTOLIC BLOOD PRESSURE: 92 MMHG | RESPIRATION RATE: 18 BRPM | SYSTOLIC BLOOD PRESSURE: 133 MMHG | TEMPERATURE: 98 F | HEART RATE: 97 BPM

## 2021-02-02 VITALS
RESPIRATION RATE: 20 BRPM | DIASTOLIC BLOOD PRESSURE: 75 MMHG | TEMPERATURE: 98 F | SYSTOLIC BLOOD PRESSURE: 133 MMHG | HEART RATE: 92 BPM | OXYGEN SATURATION: 95 %

## 2021-02-02 DIAGNOSIS — Z98.890 OTHER SPECIFIED POSTPROCEDURAL STATES: Chronic | ICD-10-CM

## 2021-02-02 PROCEDURE — 45385 COLONOSCOPY W/LESION REMOVAL: CPT

## 2021-02-02 PROCEDURE — 88305 TISSUE EXAM BY PATHOLOGIST: CPT | Mod: 26

## 2021-02-03 LAB — SURGICAL PATHOLOGY STUDY: SIGNIFICANT CHANGE UP

## 2021-02-08 DIAGNOSIS — D12.4 BENIGN NEOPLASM OF DESCENDING COLON: ICD-10-CM

## 2021-02-08 DIAGNOSIS — K57.30 DIVERTICULOSIS OF LARGE INTESTINE WITHOUT PERFORATION OR ABSCESS WITHOUT BLEEDING: ICD-10-CM

## 2021-02-08 DIAGNOSIS — Z91.041 RADIOGRAPHIC DYE ALLERGY STATUS: ICD-10-CM

## 2021-02-08 DIAGNOSIS — Z90.49 ACQUIRED ABSENCE OF OTHER SPECIFIED PARTS OF DIGESTIVE TRACT: ICD-10-CM

## 2021-02-08 DIAGNOSIS — D01.0 CARCINOMA IN SITU OF COLON: ICD-10-CM

## 2021-07-12 ENCOUNTER — OUTPATIENT (OUTPATIENT)
Dept: OUTPATIENT SERVICES | Facility: HOSPITAL | Age: 56
LOS: 1 days | Discharge: HOME | End: 2021-07-12
Payer: MEDICAID

## 2021-07-12 DIAGNOSIS — Z98.890 OTHER SPECIFIED POSTPROCEDURAL STATES: Chronic | ICD-10-CM

## 2021-07-12 DIAGNOSIS — M54.5 LOW BACK PAIN: ICD-10-CM

## 2021-07-12 PROCEDURE — 72050 X-RAY EXAM NECK SPINE 4/5VWS: CPT | Mod: 26

## 2022-02-07 ENCOUNTER — APPOINTMENT (OUTPATIENT)
Dept: SURGERY | Facility: CLINIC | Age: 57
End: 2022-02-07
Payer: MEDICAID

## 2022-02-07 VITALS
SYSTOLIC BLOOD PRESSURE: 118 MMHG | TEMPERATURE: 96.8 F | HEIGHT: 70 IN | HEART RATE: 97 BPM | DIASTOLIC BLOOD PRESSURE: 70 MMHG | WEIGHT: 196 LBS | BODY MASS INDEX: 28.06 KG/M2

## 2022-02-07 DIAGNOSIS — C18.2 MALIGNANT NEOPLASM OF ASCENDING COLON: ICD-10-CM

## 2022-02-07 PROCEDURE — 99213 OFFICE O/P EST LOW 20 MIN: CPT

## 2022-02-07 NOTE — HISTORY OF PRESENT ILLNESS
[FreeTextEntry1] : Patient is a 54M who presents for follow up of intramucosal adenocarcinoma s/p laparoscopic right hemicolectomy in 2/2021. Pathology showed intramucosal adenocarcinoma Tis with 0/19 LN.  He subsequently underwent colonoscopy and was found to have a TA. He presents today for follow up.  He is tolerating a diet and having daily BM.  Patient states he feels well.  He is tolerating a diet and denies fevers, chills, nausea, vomiting, abdominal pain, constipation, diarrhea, blood in his stool or unexpected weight loss.  He has 1-2 soft BM daily.  Patient denies a family history of colon cancer rectal cancer or inflammatory bowel disease.

## 2022-02-07 NOTE — PHYSICAL EXAM
[Abdomen Masses] : No abdominal masses [Abdomen Tenderness] : ~T No ~M abdominal tenderness [No HSM] : no hepatosplenomegaly [Respiratory Effort] : normal respiratory effort [Normal Rate and Rhythm] : normal rate and rhythm [de-identified] : port site incisions and midline extraction sites well healed. No erythema induration or purulence present. [de-identified] : awake, alert and in no acute distress

## 2022-02-07 NOTE — ASSESSMENT
[FreeTextEntry1] : 57M with Stage 0 ascending colon cancer\par \par The patient is doing well.  We will see him back for colonoscopy in 2024

## 2023-04-21 NOTE — CONSULT LETTER
[Dear  ___] : Dear  [unfilled], [Courtesy Letter:] : I had the pleasure of seeing your patient, [unfilled], in my office today. [Please see my note below.] : Please see my note below. [Consult Closing:] : Thank you very much for allowing me to participate in the care of this patient.  If you have any questions, please do not hesitate to contact me. [FreeTextEntry3] : Sincerely,\par \par Shine Hirsch MD, Colon and Rectal Surgery\par \par [employer] [facility]\par [address] [place] [address]\par [facility], 3rd Floor\par [place], [place] [address]\par Tel [phone] ext 2\par Fax [phone]\par  [DrEric  ___] : Dr. REYES ambulatory

## 2023-05-17 ENCOUNTER — APPOINTMENT (OUTPATIENT)
Dept: GASTROENTEROLOGY | Facility: CLINIC | Age: 58
End: 2023-05-17

## 2023-09-15 ENCOUNTER — TRANSCRIPTION ENCOUNTER (OUTPATIENT)
Age: 58
End: 2023-09-15

## 2023-09-15 ENCOUNTER — APPOINTMENT (OUTPATIENT)
Dept: CARDIOLOGY | Facility: CLINIC | Age: 58
End: 2023-09-15
Payer: MEDICAID

## 2023-09-15 VITALS
HEART RATE: 77 BPM | DIASTOLIC BLOOD PRESSURE: 80 MMHG | TEMPERATURE: 97.6 F | SYSTOLIC BLOOD PRESSURE: 120 MMHG | HEIGHT: 70 IN | BODY MASS INDEX: 26.92 KG/M2 | WEIGHT: 188 LBS

## 2023-09-15 DIAGNOSIS — Z82.3 FAMILY HISTORY OF STROKE: ICD-10-CM

## 2023-09-15 PROCEDURE — 99204 OFFICE O/P NEW MOD 45 MIN: CPT | Mod: 25

## 2023-09-15 PROCEDURE — 93000 ELECTROCARDIOGRAM COMPLETE: CPT

## 2023-09-15 RX ORDER — NEOMYCIN SULFATE 500 MG/1
500 TABLET ORAL
Qty: 6 | Refills: 0 | Status: DISCONTINUED | COMMUNITY
Start: 2020-02-04 | End: 2023-09-15

## 2023-09-15 RX ORDER — ENOXAPARIN SODIUM 100 MG/ML
40 INJECTION SUBCUTANEOUS DAILY
Qty: 28 | Refills: 0 | Status: DISCONTINUED | COMMUNITY
Start: 2020-02-04 | End: 2023-09-15

## 2023-09-15 RX ORDER — METRONIDAZOLE 500 MG/1
500 TABLET ORAL
Qty: 6 | Refills: 0 | Status: DISCONTINUED | COMMUNITY
Start: 2020-02-04 | End: 2023-09-15

## 2023-09-16 PROBLEM — Z82.3 FAMILY HISTORY OF CEREBROVASCULAR ACCIDENT (CVA): Status: ACTIVE | Noted: 2023-09-16

## 2023-09-18 ENCOUNTER — APPOINTMENT (OUTPATIENT)
Dept: CARDIOLOGY | Facility: CLINIC | Age: 58
End: 2023-09-18
Payer: MEDICAID

## 2023-09-18 PROCEDURE — 93325 DOPPLER ECHO COLOR FLOW MAPG: CPT

## 2023-09-18 PROCEDURE — 93351 STRESS TTE COMPLETE: CPT

## 2023-09-18 PROCEDURE — 93320 DOPPLER ECHO COMPLETE: CPT

## 2023-09-21 ENCOUNTER — APPOINTMENT (OUTPATIENT)
Dept: ORTHOPEDIC SURGERY | Facility: CLINIC | Age: 58
End: 2023-09-21
Payer: MEDICAID

## 2023-09-21 PROCEDURE — 99203 OFFICE O/P NEW LOW 30 MIN: CPT

## 2023-09-21 PROCEDURE — 73560 X-RAY EXAM OF KNEE 1 OR 2: CPT | Mod: LT

## 2023-09-21 RX ORDER — MELOXICAM 15 MG/1
15 TABLET ORAL
Qty: 30 | Refills: 2 | Status: ACTIVE | COMMUNITY
Start: 2023-09-21 | End: 1900-01-01

## 2023-10-12 NOTE — ASU PREOP CHECKLIST - HEIGHT IN FEET
How Did The Scalp Condition Occur?: gradual in onset  (over a period of years) How Severe Is The Scalp Condition?: moderate Additional History: Patient is using baby shampoo and her scalp burns. 5

## 2023-11-03 ENCOUNTER — APPOINTMENT (OUTPATIENT)
Dept: ORTHOPEDIC SURGERY | Facility: CLINIC | Age: 58
End: 2023-11-03
Payer: MEDICAID

## 2023-11-03 PROCEDURE — 99213 OFFICE O/P EST LOW 20 MIN: CPT

## 2023-11-03 RX ORDER — MELOXICAM 15 MG/1
15 TABLET ORAL
Qty: 30 | Refills: 2 | Status: ACTIVE | COMMUNITY
Start: 2023-11-03 | End: 1900-01-01

## 2023-12-15 ENCOUNTER — APPOINTMENT (OUTPATIENT)
Dept: CARDIOLOGY | Facility: CLINIC | Age: 58
End: 2023-12-15
Payer: MEDICAID

## 2023-12-15 VITALS
HEART RATE: 63 BPM | DIASTOLIC BLOOD PRESSURE: 80 MMHG | TEMPERATURE: 97.6 F | SYSTOLIC BLOOD PRESSURE: 110 MMHG | WEIGHT: 188 LBS | BODY MASS INDEX: 26.92 KG/M2 | HEIGHT: 70 IN

## 2023-12-15 DIAGNOSIS — R06.09 OTHER FORMS OF DYSPNEA: ICD-10-CM

## 2023-12-15 PROCEDURE — 99213 OFFICE O/P EST LOW 20 MIN: CPT | Mod: 25

## 2023-12-15 PROCEDURE — 93000 ELECTROCARDIOGRAM COMPLETE: CPT

## 2023-12-17 ENCOUNTER — RX RENEWAL (OUTPATIENT)
Age: 58
End: 2023-12-17

## 2023-12-17 PROBLEM — R06.09 DYSPNEA ON EXERTION: Status: ACTIVE | Noted: 2023-09-15

## 2023-12-17 RX ORDER — ROSUVASTATIN CALCIUM 5 MG/1
5 TABLET, FILM COATED ORAL
Qty: 90 | Refills: 0 | Status: ACTIVE | COMMUNITY
Start: 2023-09-15 | End: 1900-01-01

## 2023-12-17 NOTE — CARDIOLOGY SUMMARY
[de-identified] : 12/15/23 NSR  9/15/23 NSR  [de-identified] : 9/18/23 1. And normal systolic function, with a calculated ejection fraction of 56 % by Meadows's biplane method of discs. 2. No echocardiographic evidence of exercise induced ischemia.

## 2023-12-17 NOTE — ASSESSMENT
[FreeTextEntry1] : 58 year old male is here for cardiovascular evaluation. Had recent blood work showing elevated LDL and TG level. reports no chest pain. Stress echo 9/16/ no ischemia . Started on Crestor 5 with improvement in LDL.    Plan:  continue statin  repeat labs  prior to follow up

## 2023-12-17 NOTE — HISTORY OF PRESENT ILLNESS
[FreeTextEntry1] : 58 year old male is here for cardiovascular evaluation. Had recent blood work showing elevated LDL and TG level. reports no chest pain. Stress echo 9/16/ no ischemia . Started on Crestor 5 with improvement in LDL.

## 2023-12-20 NOTE — ASU PREOP CHECKLIST - RESPIRATORY RATE (BREATHS/MIN)
1. I reviewed the lipid panel from 10/23/2023: , HDL 29, , .  2. LDL is above goal.  3. Patient doesn't take a statin.    18

## 2024-01-01 NOTE — CHART NOTE - NSCHARTNOTEFT_GEN_A_CORE
PACU ANESTHESIA ADMISSION NOTE      Procedure:   Post op diagnosis:      ____  Intubated  TV:______       Rate: ______      FiO2: ______    __x__  Patent Airway    __x__  Full return of protective reflexes    __x__  Full recovery from anesthesia / back to baseline     Vitals:   See Anesthesia record  T- 98.3 P- 90 R-18 B/P- 112/73 SpO2- 95%    Mental Status:  __x__ Awake   _____ Alert   __x___ Drowsy   _____ Sedated    Nausea/Vomiting:  __x__ NO  ______Yes,   See Post - Op Orders          Pain Scale (0-10):  ___0__    Treatment: ____ None    ____ See Post - Op/PCA Orders    Post - Operative Fluids:   ____ Oral   __x__ See Post - Op Orders    Plan: Discharge:   __x__Home       _____Floor     _____Critical Care    _____  Other:_________________    Comments: No anesthesia complications/issues noted. Discharge to home when criteria met. PEDS

## 2024-01-30 NOTE — H&P PST ADULT - PRO ANTICIPATED DISCH DISP
FOLLOW UP VISIT    Subjective:    Ruthann Lynn (: home

## 2024-02-06 ENCOUNTER — NON-APPOINTMENT (OUTPATIENT)
Age: 59
End: 2024-02-06

## 2024-02-06 ENCOUNTER — APPOINTMENT (OUTPATIENT)
Dept: ORTHOPEDIC SURGERY | Facility: CLINIC | Age: 59
End: 2024-02-06
Payer: MEDICAID

## 2024-02-06 DIAGNOSIS — M25.562 PAIN IN LEFT KNEE: ICD-10-CM

## 2024-02-06 PROCEDURE — 99213 OFFICE O/P EST LOW 20 MIN: CPT

## 2024-02-06 NOTE — ASSESSMENT
[FreeTextEntry1] : 59-year-old gentleman with a degenerative posterior medial meniscal tear which seems to have settled out.  He can continue with NSAIDs on a as needed basis.  He is instructed to continue with his self-directed physical therapy exercise program.  Follow-up on an as-needed basis.  If his symptoms worsen he is always welcome back.  All questions answered to his satisfaction and he agrees with the plan.

## 2024-02-06 NOTE — IMAGING
[de-identified] : Pleasant middle-age gentleman sits and stands comfortably in my office.  He walked in with no antalgia and a normal gait.  Physical examination: Left knee: No tenderness palpation along the joint line.  He does have a mildly abnormal Apley's test.  Negative Camron's.  Negative Lachman.  No varus valgus instability.  Negative patellofemoral grind test.  Mild synovial thickening.  No effusion.  No geniculate lymph nodes or masses.  Knee motion is unchanged 0-130 degrees.  Radiographs: Deferred

## 2024-02-06 NOTE — HISTORY OF PRESENT ILLNESS
[de-identified] : 59-year-old gentleman returns interval follow-up of left posterior medial knee pain with a presumptive diagnosis degenerative medial meniscal tear.  Patient participated in a physical therapy program which she found somewhat helpful.  An ASO was utilized and the nonsteroidal anti-inflammatory medications.  His pain has significantly improved.  He only has discomfort with certain twisting motions on rare occasion.  By adjusted position of his legs his pain settles out.  Denies any significant instability symptoms besides this discomfort.  Is walking without assistive devices and notes that the pain really only occurs in the evenings not during the day.  It does not interfere with his ability to function or work.

## 2024-05-14 ENCOUNTER — APPOINTMENT (OUTPATIENT)
Dept: ORTHOPEDIC SURGERY | Facility: CLINIC | Age: 59
End: 2024-05-14
Payer: MEDICAID

## 2024-05-14 VITALS — BODY MASS INDEX: 26.77 KG/M2 | WEIGHT: 187 LBS | HEIGHT: 70 IN

## 2024-05-14 PROCEDURE — 73140 X-RAY EXAM OF FINGER(S): CPT | Mod: RT

## 2024-05-14 PROCEDURE — 99202 OFFICE O/P NEW SF 15 MIN: CPT | Mod: 25

## 2024-05-14 PROCEDURE — 99212 OFFICE O/P EST SF 10 MIN: CPT | Mod: 25

## 2024-05-14 PROCEDURE — 20550 NJX 1 TENDON SHEATH/LIGAMENT: CPT | Mod: F7

## 2024-05-14 NOTE — PROCEDURE
[FreeTextEntry3] : Trigger finger injection was performed because of pain inflammation and stiffness Anesthesia: ethyl chloride sprayed topically Dexamethasone injection of 1cc 4mg/ml Lidocaine: An injection of Lidocaine 1% 1cc  Patient has tried OTC's including aspirin, Ibuprofen, Aleve etc or prescription NSAIDS, and/or exercises at home and/ or physical therapy without satisfactory response. After verbal consent using sterile preparation and technique. The risks, benefits, and alternatives to cortisone injection were explained in full to the patient. Risks outlined include but are not limited to infection, sepsis, bleeding, scarring, skin discoloration, temporary increase in pain, syncopal episode, failure to resolve symptoms, allergic reaction, symptom recurrence, and elevation of blood sugar in diabetics. Patient understood the risks. All questions were answered. After discussion of options, patient requested an injection. Oral informed consent was obtained and sterile prep was done of the injection site. Sterile technique was utilized for the procedure including the preparation of the solutions used for the injection. Patient tolerated the procedure well. Advised to ice the injection site this evening. Prep with ETOH  locally to site. Sterile technique used.  tendon sheath injected Right middle finger flexor tendon sheath

## 2024-05-14 NOTE — ASSESSMENT
[FreeTextEntry1] : Patient had a right middle finger issue.  Upon secondary questioning it appears as though the patient does have a trigger finger.  We given a cortisone injection into the A1 pulley area.  This is a trigger finger with a flexion contracture.  He will see us back in 1 month for potential second injection.  He is instructed to do stretching exercises as well

## 2024-05-14 NOTE — DATA REVIEWED
[FreeTextEntry1] : Radiographs 3 views of the right middle finger show no fracture dislocation no destructive lesions minimal degenerative change

## 2024-05-14 NOTE — PHYSICAL EXAM
[de-identified] : Patient has no tenderness to palpation at the A1 pulley.  There is no triggering present.  15 degree PIP flexion contracture of the right middle finger.  There is degenerative change palpable at the DIP joint.  The nail plate is intact.  No Dupuytren's cord is palpable.  Small mass may be present on the ulnar aspect of the middle finger.

## 2024-05-14 NOTE — HISTORY OF PRESENT ILLNESS
[de-identified] : 59-year-old male with right middle finger that does not straighten all the way.  Has any trauma to the area.  Denies any specific overuse.  Denies any locking of the finger.  Comes in for evaluation today.  He has had no workup for the condition.

## 2024-05-21 ENCOUNTER — APPOINTMENT (OUTPATIENT)
Dept: CARDIOLOGY | Facility: CLINIC | Age: 59
End: 2024-05-21

## 2024-05-30 ENCOUNTER — APPOINTMENT (OUTPATIENT)
Dept: CARDIOLOGY | Facility: CLINIC | Age: 59
End: 2024-05-30
Payer: MEDICAID

## 2024-05-30 VITALS
WEIGHT: 190 LBS | SYSTOLIC BLOOD PRESSURE: 112 MMHG | HEART RATE: 65 BPM | HEIGHT: 70 IN | BODY MASS INDEX: 27.2 KG/M2 | DIASTOLIC BLOOD PRESSURE: 78 MMHG

## 2024-05-30 DIAGNOSIS — Z00.00 ENCOUNTER FOR GENERAL ADULT MEDICAL EXAMINATION W/OUT ABNORMAL FINDINGS: ICD-10-CM

## 2024-05-30 DIAGNOSIS — R29.818 OTHER SYMPTOMS AND SIGNS INVOLVING THE NERVOUS SYSTEM: ICD-10-CM

## 2024-05-30 DIAGNOSIS — E78.2 MIXED HYPERLIPIDEMIA: ICD-10-CM

## 2024-05-30 PROCEDURE — 93000 ELECTROCARDIOGRAM COMPLETE: CPT

## 2024-05-30 PROCEDURE — 99214 OFFICE O/P EST MOD 30 MIN: CPT | Mod: 25

## 2024-05-30 NOTE — ASSESSMENT
[FreeTextEntry1] : Mr. Geronimo is a 59-year-old man with history of colon CA s/p partial colectomy and dyslipidemia presenting for follow up.  Impression: (1) Dyslipidemia, well controlled on rosuvastatin 5mg (2) Suspected sleep apnea, juice, has a sleep study pending with [facility] (3) Colon CA, s/p partial colectomy, no radiation or chemotherapy  Plan: - Agree with sleep study, patient can have a home study done here if he has difficulty completing his study with [facility] - Continue statin - Check cardiometabolic labs including lp(a) and hsCRP - Lifestyle modification discussed, including increasing physical activity levels  RTC 6-12 months, sooner as needed

## 2024-05-30 NOTE — HISTORY OF PRESENT ILLNESS
[FreeTextEntry1] : Mr. Geronimo is a 59-year-old man with history of colon CA s/p partial colectomy and dyslipidemia presenting for follow up.  Patient previously followed with Dr. Blackmon and was last seen in office 12/2023. At that time was doing well without noted complaints.  Today feels well, denies any active complaints.  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 Raised in [facility].  Exercise Stress Echo 9/2023: Negative for ischemia, 6.5 mins, 8 METs, 90% MPHR, no symptoms. Baseline study grossly unremarkable.  Labs: - Cr 0.8, K 4.2, normal transaminases - , HDL 49, , LDL 70  Meds: - Rosuvastatin 5mg daily - Meloxicam PRN

## 2024-06-18 ENCOUNTER — APPOINTMENT (OUTPATIENT)
Dept: ORTHOPEDIC SURGERY | Facility: CLINIC | Age: 59
End: 2024-06-18
Payer: MEDICAID

## 2024-06-18 DIAGNOSIS — M65.331 TRIGGER FINGER, RIGHT MIDDLE FINGER: ICD-10-CM

## 2024-06-18 PROCEDURE — 99213 OFFICE O/P EST LOW 20 MIN: CPT

## 2024-06-18 NOTE — PHYSICAL EXAM
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  He has less changes at the PIP joint.  He has no tenderness to palpation along the A1 pulley.  There is no active triggering.  There is normal capillary refill.  No erythema ecchymoses or abrasions.  No masses.

## 2024-06-18 NOTE — ASSESSMENT
[FreeTextEntry1] : I saw the patient a right middle finger trigger digit and therefore had given him an injection.  I am not sure if he does have a trigger finger.  He does have a flexion contracture that resolves with stretching.  He will see me back on an as-needed basis.  This could be the beginnings of degenerative change in the PIP joint as well.

## 2024-08-12 NOTE — HISTORY OF PRESENT ILLNESS
[de-identified] : 59-year-old male still in the same problem with his right middle finger.  Right middle finger stays bent if he does not continue to work at it when he continues to work at it and then it does not stay as bent.  He is able to straighten it out and he comes in today for evaluation he does not feel as though the cortisone injection changed anything about his hands.
Alcohol abuse

## 2024-10-02 ENCOUNTER — APPOINTMENT (OUTPATIENT)
Dept: SURGERY | Facility: CLINIC | Age: 59
End: 2024-10-02

## 2024-10-02 VITALS
HEART RATE: 72 BPM | HEIGHT: 70 IN | SYSTOLIC BLOOD PRESSURE: 126 MMHG | DIASTOLIC BLOOD PRESSURE: 80 MMHG | WEIGHT: 190 LBS | OXYGEN SATURATION: 96 % | BODY MASS INDEX: 27.2 KG/M2

## 2024-10-02 DIAGNOSIS — C18.2 MALIGNANT NEOPLASM OF ASCENDING COLON: ICD-10-CM

## 2024-10-02 PROCEDURE — 99214 OFFICE O/P EST MOD 30 MIN: CPT

## 2024-10-03 ENCOUNTER — NON-APPOINTMENT (OUTPATIENT)
Age: 59
End: 2024-10-03

## 2024-10-07 NOTE — HISTORY OF PRESENT ILLNESS
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 Pathology showed intramucosal adenocarcinoma Tis with 0/19 LN. He subsequently underwent colonoscopy and was found to have a TA. Since the patient's last office visit, he reports worsening in his bowel movements. He reports three to four bowel movements daily and has urgency with his bowel movements. He also reports abdominal pains with physical activity. He's tolerating a diet and denies recent fevers, chills, nausea, or vomiting. Patient denies a family history of colon cancer rectal cancer or inflammatory bowel disease.

## 2024-10-07 NOTE — END OF VISIT
[FreeTextEntry3] : Documented by Dorcas Tompkins acting as a scribe for Shine Hirsch on 10/07/2024   All medical record entries made by the Scribe were at my, Dr. Shine Hirsch direction and personally dictated by me on 10/07/2024 . I have reviewed the chart and agree that the record accurately reflects my personal performance of the history, physical exam, assessment and plan. I have also personally directed, reviewed, and agreed with the chart.

## 2024-10-07 NOTE — ASSESSMENT
[FreeTextEntry1] : 59-year-old male with stage 0, ascending colon cancer.  I spoke to the patient regarding his condition. We discussed he can take a high-fiber diet with fiber supplements to improve his bowel movements. We would plan for a colonoscopy for further evaluation. All risks, benefits, and alternatives to colonoscopy were discussed with the patient including bleeding, infection, missed lesion, incomplete colonoscopy, and perforation requiring operative intervention. The patient expressed understanding and was in agreement with the plan.

## 2024-10-07 NOTE — PHYSICAL EXAM
[FreeTextEntry1] : General: Awake, Alert, No Acute Distress Respiratory: Normal Respiratory Effort Abdomen: Soft, non-tender, non-distended, well-healing port sites, and midline extraction site.

## 2024-10-07 NOTE — ADDENDUM
[FreeTextEntry1] :  I, Dorcsa Tompkins ([facility]) assisted in filling out this chart under the dictation of Shine Hirsch on 10/07/2024

## 2024-11-21 ENCOUNTER — APPOINTMENT (OUTPATIENT)
Dept: CARDIOLOGY | Facility: CLINIC | Age: 59
End: 2024-11-21
Payer: MEDICAID

## 2024-11-21 VITALS
DIASTOLIC BLOOD PRESSURE: 80 MMHG | HEIGHT: 70 IN | WEIGHT: 190 LBS | HEART RATE: 69 BPM | BODY MASS INDEX: 27.2 KG/M2 | SYSTOLIC BLOOD PRESSURE: 110 MMHG

## 2024-11-21 DIAGNOSIS — Z00.00 ENCOUNTER FOR GENERAL ADULT MEDICAL EXAMINATION W/OUT ABNORMAL FINDINGS: ICD-10-CM

## 2024-11-21 DIAGNOSIS — E78.2 MIXED HYPERLIPIDEMIA: ICD-10-CM

## 2024-11-21 PROCEDURE — 99213 OFFICE O/P EST LOW 20 MIN: CPT

## 2024-11-21 PROCEDURE — 93000 ELECTROCARDIOGRAM COMPLETE: CPT

## 2024-11-21 RX ORDER — SERTRALINE HYDROCHLORIDE 100 MG/1
100 TABLET, FILM COATED ORAL DAILY
Refills: 0 | Status: ACTIVE | COMMUNITY

## 2025-09-02 ENCOUNTER — APPOINTMENT (OUTPATIENT)
Dept: SURGERY | Facility: CLINIC | Age: 60
End: 2025-09-02
Payer: MEDICAID

## 2025-09-02 VITALS
WEIGHT: 190 LBS | HEART RATE: 75 BPM | BODY MASS INDEX: 27.2 KG/M2 | HEIGHT: 70 IN | OXYGEN SATURATION: 97 % | DIASTOLIC BLOOD PRESSURE: 80 MMHG | SYSTOLIC BLOOD PRESSURE: 124 MMHG | TEMPERATURE: 97 F

## 2025-09-02 DIAGNOSIS — C18.2 MALIGNANT NEOPLASM OF ASCENDING COLON: ICD-10-CM

## 2025-09-02 PROCEDURE — 99214 OFFICE O/P EST MOD 30 MIN: CPT
